# Patient Record
Sex: MALE | Race: WHITE | Employment: PART TIME | URBAN - METROPOLITAN AREA
[De-identification: names, ages, dates, MRNs, and addresses within clinical notes are randomized per-mention and may not be internally consistent; named-entity substitution may affect disease eponyms.]

---

## 2020-01-30 ENCOUNTER — HOSPITAL ENCOUNTER (INPATIENT)
Age: 55
LOS: 1 days | Discharge: HOME | DRG: 247 | End: 2020-02-01
Attending: EMERGENCY MEDICINE | Admitting: HOSPITALIST
Payer: COMMERCIAL

## 2020-01-30 ENCOUNTER — APPOINTMENT (OUTPATIENT)
Dept: GENERAL RADIOLOGY | Age: 55
DRG: 247 | End: 2020-01-30
Attending: EMERGENCY MEDICINE
Payer: COMMERCIAL

## 2020-01-30 DIAGNOSIS — R94.31 ABNORMAL EKG: ICD-10-CM

## 2020-01-30 DIAGNOSIS — I24.9 ACS (ACUTE CORONARY SYNDROME) (HCC): ICD-10-CM

## 2020-01-30 DIAGNOSIS — R07.89 ATYPICAL CHEST PAIN: Primary | ICD-10-CM

## 2020-01-30 PROBLEM — R07.9 CHEST PAIN: Status: ACTIVE | Noted: 2020-01-30

## 2020-01-30 LAB
ALBUMIN SERPL-MCNC: 3.3 G/DL (ref 3.5–5)
ALBUMIN/GLOB SERPL: 1.1 {RATIO} (ref 1.1–2.2)
ALP SERPL-CCNC: 62 U/L (ref 45–117)
ALT SERPL-CCNC: 35 U/L (ref 12–78)
ANION GAP SERPL CALC-SCNC: 10 MMOL/L (ref 5–15)
APPEARANCE UR: CLEAR
AST SERPL-CCNC: 26 U/L (ref 15–37)
BACTERIA URNS QL MICRO: NEGATIVE /HPF
BASOPHILS # BLD: 0.1 K/UL (ref 0–0.1)
BASOPHILS NFR BLD: 1 % (ref 0–1)
BILIRUB SERPL-MCNC: 0.2 MG/DL (ref 0.2–1)
BILIRUB UR QL: NEGATIVE
BNP SERPL-MCNC: 62 PG/ML
BUN SERPL-MCNC: 8 MG/DL (ref 6–20)
BUN/CREAT SERPL: 13 (ref 12–20)
CALCIUM SERPL-MCNC: 8.5 MG/DL (ref 8.5–10.1)
CHLORIDE SERPL-SCNC: 112 MMOL/L (ref 97–108)
CK SERPL-CCNC: 196 U/L (ref 39–308)
CO2 SERPL-SCNC: 22 MMOL/L (ref 21–32)
COLOR UR: NORMAL
CREAT SERPL-MCNC: 0.64 MG/DL (ref 0.7–1.3)
D DIMER PPP FEU-MCNC: 0.26 MG/L FEU (ref 0–0.65)
DIFFERENTIAL METHOD BLD: NORMAL
EOSINOPHIL # BLD: 0.2 K/UL (ref 0–0.4)
EOSINOPHIL NFR BLD: 3 % (ref 0–7)
EPITH CASTS URNS QL MICRO: NORMAL /LPF
ERYTHROCYTE [DISTWIDTH] IN BLOOD BY AUTOMATED COUNT: 12.6 % (ref 11.5–14.5)
GLOBULIN SER CALC-MCNC: 3 G/DL (ref 2–4)
GLUCOSE SERPL-MCNC: 158 MG/DL (ref 65–100)
GLUCOSE UR STRIP.AUTO-MCNC: NEGATIVE MG/DL
HCT VFR BLD AUTO: 43 % (ref 36.6–50.3)
HGB BLD-MCNC: 14.9 G/DL (ref 12.1–17)
HGB UR QL STRIP: NEGATIVE
HYALINE CASTS URNS QL MICRO: NORMAL /LPF (ref 0–5)
IMM GRANULOCYTES # BLD AUTO: 0 K/UL (ref 0–0.04)
IMM GRANULOCYTES NFR BLD AUTO: 0 % (ref 0–0.5)
KETONES UR QL STRIP.AUTO: NEGATIVE MG/DL
LEUKOCYTE ESTERASE UR QL STRIP.AUTO: NEGATIVE
LYMPHOCYTES # BLD: 2.1 K/UL (ref 0.8–3.5)
LYMPHOCYTES NFR BLD: 29 % (ref 12–49)
MCH RBC QN AUTO: 32.5 PG (ref 26–34)
MCHC RBC AUTO-ENTMCNC: 34.7 G/DL (ref 30–36.5)
MCV RBC AUTO: 93.7 FL (ref 80–99)
MONOCYTES # BLD: 0.4 K/UL (ref 0–1)
MONOCYTES NFR BLD: 6 % (ref 5–13)
NEUTS SEG # BLD: 4.5 K/UL (ref 1.8–8)
NEUTS SEG NFR BLD: 61 % (ref 32–75)
NITRITE UR QL STRIP.AUTO: NEGATIVE
NRBC # BLD: 0 K/UL (ref 0–0.01)
NRBC BLD-RTO: 0 PER 100 WBC
PH UR STRIP: 6 [PH] (ref 5–8)
PLATELET # BLD AUTO: 278 K/UL (ref 150–400)
PMV BLD AUTO: 9.6 FL (ref 8.9–12.9)
POTASSIUM SERPL-SCNC: 3.9 MMOL/L (ref 3.5–5.1)
PROT SERPL-MCNC: 6.3 G/DL (ref 6.4–8.2)
PROT UR STRIP-MCNC: NEGATIVE MG/DL
RBC # BLD AUTO: 4.59 M/UL (ref 4.1–5.7)
RBC #/AREA URNS HPF: NORMAL /HPF (ref 0–5)
SODIUM SERPL-SCNC: 144 MMOL/L (ref 136–145)
SP GR UR REFRACTOMETRY: 1.02 (ref 1–1.03)
TROPONIN I BLD-MCNC: <0.04 NG/ML (ref 0–0.08)
TROPONIN I SERPL-MCNC: <0.05 NG/ML
UA: UC IF INDICATED,UAUC: NORMAL
UROBILINOGEN UR QL STRIP.AUTO: 0.2 EU/DL (ref 0.2–1)
WBC # BLD AUTO: 7.2 K/UL (ref 4.1–11.1)
WBC URNS QL MICRO: NORMAL /HPF (ref 0–4)

## 2020-01-30 PROCEDURE — 83880 ASSAY OF NATRIURETIC PEPTIDE: CPT

## 2020-01-30 PROCEDURE — 85379 FIBRIN DEGRADATION QUANT: CPT

## 2020-01-30 PROCEDURE — 74011000250 HC RX REV CODE- 250: Performed by: EMERGENCY MEDICINE

## 2020-01-30 PROCEDURE — 84484 ASSAY OF TROPONIN QUANT: CPT

## 2020-01-30 PROCEDURE — 99285 EMERGENCY DEPT VISIT HI MDM: CPT

## 2020-01-30 PROCEDURE — 96374 THER/PROPH/DIAG INJ IV PUSH: CPT

## 2020-01-30 PROCEDURE — 83036 HEMOGLOBIN GLYCOSYLATED A1C: CPT

## 2020-01-30 PROCEDURE — 85025 COMPLETE CBC W/AUTO DIFF WBC: CPT

## 2020-01-30 PROCEDURE — 82550 ASSAY OF CK (CPK): CPT

## 2020-01-30 PROCEDURE — 74011250636 HC RX REV CODE- 250/636: Performed by: EMERGENCY MEDICINE

## 2020-01-30 PROCEDURE — 81001 URINALYSIS AUTO W/SCOPE: CPT

## 2020-01-30 PROCEDURE — 96375 TX/PRO/DX INJ NEW DRUG ADDON: CPT

## 2020-01-30 PROCEDURE — 71046 X-RAY EXAM CHEST 2 VIEWS: CPT

## 2020-01-30 PROCEDURE — 93005 ELECTROCARDIOGRAM TRACING: CPT

## 2020-01-30 PROCEDURE — 80061 LIPID PANEL: CPT

## 2020-01-30 PROCEDURE — 36415 COLL VENOUS BLD VENIPUNCTURE: CPT

## 2020-01-30 PROCEDURE — 77030029684 HC NEB SM VOL KT MONA -A

## 2020-01-30 PROCEDURE — 80053 COMPREHEN METABOLIC PANEL: CPT

## 2020-01-30 RX ORDER — ONDANSETRON 2 MG/ML
4 INJECTION INTRAMUSCULAR; INTRAVENOUS
Status: DISCONTINUED | OUTPATIENT
Start: 2020-01-30 | End: 2020-02-01 | Stop reason: HOSPADM

## 2020-01-30 RX ORDER — HYDROCHLOROTHIAZIDE 12.5 MG/1
25 CAPSULE ORAL DAILY
COMMUNITY

## 2020-01-30 RX ORDER — SODIUM CHLORIDE 0.9 % (FLUSH) 0.9 %
5-40 SYRINGE (ML) INJECTION EVERY 8 HOURS
Status: DISCONTINUED | OUTPATIENT
Start: 2020-01-30 | End: 2020-02-01 | Stop reason: HOSPADM

## 2020-01-30 RX ORDER — HYDROCHLOROTHIAZIDE 12.5 MG/1
25 CAPSULE ORAL DAILY
Status: DISCONTINUED | OUTPATIENT
Start: 2020-01-31 | End: 2020-01-31

## 2020-01-30 RX ORDER — AMLODIPINE BESYLATE 10 MG/1
10 TABLET ORAL DAILY
COMMUNITY

## 2020-01-30 RX ORDER — SODIUM CHLORIDE 0.9 % (FLUSH) 0.9 %
5-40 SYRINGE (ML) INJECTION AS NEEDED
Status: DISCONTINUED | OUTPATIENT
Start: 2020-01-30 | End: 2020-02-01 | Stop reason: HOSPADM

## 2020-01-30 RX ORDER — MORPHINE SULFATE 2 MG/ML
4 INJECTION, SOLUTION INTRAMUSCULAR; INTRAVENOUS ONCE
Status: COMPLETED | OUTPATIENT
Start: 2020-01-30 | End: 2020-01-30

## 2020-01-30 RX ORDER — AMLODIPINE BESYLATE 5 MG/1
10 TABLET ORAL DAILY
Status: DISCONTINUED | OUTPATIENT
Start: 2020-01-31 | End: 2020-02-01 | Stop reason: HOSPADM

## 2020-01-30 RX ORDER — ACETAMINOPHEN 325 MG/1
500 TABLET ORAL
COMMUNITY

## 2020-01-30 RX ORDER — METHOCARBAMOL 750 MG/1
750 TABLET, FILM COATED ORAL 4 TIMES DAILY
COMMUNITY

## 2020-01-30 RX ORDER — IPRATROPIUM BROMIDE AND ALBUTEROL SULFATE 2.5; .5 MG/3ML; MG/3ML
3 SOLUTION RESPIRATORY (INHALATION)
Status: COMPLETED | OUTPATIENT
Start: 2020-01-30 | End: 2020-01-30

## 2020-01-30 RX ORDER — ACETAMINOPHEN 325 MG/1
650 TABLET ORAL
Status: DISCONTINUED | OUTPATIENT
Start: 2020-01-30 | End: 2020-02-01 | Stop reason: HOSPADM

## 2020-01-30 RX ORDER — BACLOFEN 10 MG/1
10 TABLET ORAL 3 TIMES DAILY
COMMUNITY

## 2020-01-30 RX ORDER — ONDANSETRON 2 MG/ML
8 INJECTION INTRAMUSCULAR; INTRAVENOUS
Status: COMPLETED | OUTPATIENT
Start: 2020-01-30 | End: 2020-01-30

## 2020-01-30 RX ADMIN — MORPHINE SULFATE 4 MG: 2 INJECTION, SOLUTION INTRAMUSCULAR; INTRAVENOUS at 22:28

## 2020-01-30 RX ADMIN — IPRATROPIUM BROMIDE AND ALBUTEROL SULFATE 3 ML: .5; 3 SOLUTION RESPIRATORY (INHALATION) at 22:28

## 2020-01-30 RX ADMIN — SODIUM CHLORIDE 1000 ML: 900 INJECTION, SOLUTION INTRAVENOUS at 22:36

## 2020-01-30 RX ADMIN — ONDANSETRON 8 MG: 2 INJECTION INTRAMUSCULAR; INTRAVENOUS at 22:28

## 2020-01-30 NOTE — Clinical Note
Lesion located in the Proximal Diag 1. Balloon inserted. Balloon inflated using multiple inflations inflation technique. Pressure = 8 leola; Duration = 16 sec. Inflation 2: Pressure: 8 leola; Duration: 15 sec.

## 2020-01-30 NOTE — Clinical Note
TRANSFER - OUT REPORT:     Verbal report given to: Julissa. Report consisted of patient's Situation, Background, Assessment and   Recommendations(SBAR). Opportunity for questions and clarification was provided. Patient transported with a Registered Nurse. Patient transported to: [facility].

## 2020-01-30 NOTE — Clinical Note
Lesion: Located in the Proximal Diag 1. Stent inserted. Stent deployed. First inflation pressure = 12 leola; inflation time: 15 sec.

## 2020-01-31 ENCOUNTER — APPOINTMENT (OUTPATIENT)
Dept: NON INVASIVE DIAGNOSTICS | Age: 55
DRG: 247 | End: 2020-01-31
Attending: NURSE PRACTITIONER
Payer: COMMERCIAL

## 2020-01-31 PROBLEM — Z98.890 S/P CARDIAC CATH: Status: ACTIVE | Noted: 2020-01-31

## 2020-01-31 PROBLEM — I25.10 CAD (CORONARY ARTERY DISEASE): Status: ACTIVE | Noted: 2020-01-31

## 2020-01-31 LAB
ANION GAP SERPL CALC-SCNC: 5 MMOL/L (ref 5–15)
ATRIAL RATE: 107 BPM
ATRIAL RATE: 85 BPM
ATRIAL RATE: 87 BPM
AV VELOCITY RATIO: 0.94
BUN SERPL-MCNC: 12 MG/DL (ref 6–20)
BUN/CREAT SERPL: 17 (ref 12–20)
CALCIUM SERPL-MCNC: 8.5 MG/DL (ref 8.5–10.1)
CALCULATED P AXIS, ECG09: 53 DEGREES
CALCULATED P AXIS, ECG09: 64 DEGREES
CALCULATED P AXIS, ECG09: 67 DEGREES
CALCULATED R AXIS, ECG10: 0 DEGREES
CALCULATED R AXIS, ECG10: 17 DEGREES
CALCULATED R AXIS, ECG10: 20 DEGREES
CALCULATED T AXIS, ECG11: 17 DEGREES
CALCULATED T AXIS, ECG11: 35 DEGREES
CALCULATED T AXIS, ECG11: 38 DEGREES
CHLORIDE SERPL-SCNC: 108 MMOL/L (ref 97–108)
CHOLEST SERPL-MCNC: 252 MG/DL
CO2 SERPL-SCNC: 29 MMOL/L (ref 21–32)
CREAT SERPL-MCNC: 0.71 MG/DL (ref 0.7–1.3)
DIAGNOSIS, 93000: NORMAL
ECHO AV AREA PEAK VELOCITY: 3.2 CM2
ECHO AV AREA/BSA PEAK VELOCITY: 1.7 CM2/M2
ECHO AV CUSP MM: 2.14 CM
ECHO AV PEAK GRADIENT: 6.2 MMHG
ECHO AV PEAK VELOCITY: 124.78 CM/S
ECHO LA MAJOR AXIS: 3.64 CM
ECHO LV EDV TEICHHOLZ: 0.38 ML
ECHO LV ESV TEICHHOLZ: 0.14 ML
ECHO LV INTERNAL DIMENSION DIASTOLIC: 3.76 CM (ref 4.2–5.9)
ECHO LV INTERNAL DIMENSION SYSTOLIC: 2.5 CM
ECHO LV IVSD: 2.11 CM (ref 0.6–1)
ECHO LV IVSS: 2.65 CM
ECHO LV MASS 2D: 451.2 G (ref 88–224)
ECHO LV MASS INDEX 2D: 246.9 G/M2 (ref 49–115)
ECHO LV POSTERIOR WALL DIASTOLIC: 2.06 CM (ref 0.6–1)
ECHO LV POSTERIOR WALL SYSTOLIC: 2.56 CM
ECHO LVOT DIAM: 2.07 CM
ECHO LVOT PEAK GRADIENT: 5.5 MMHG
ECHO LVOT PEAK VELOCITY: 117.07 CM/S
ECHO MV A VELOCITY: 67.39 CM/S
ECHO MV E DECELERATION TIME (DT): 230.8 MS
ECHO MV E VELOCITY: 54.94 CM/S
ECHO MV E/A RATIO: 0.82
ECHO PV MAX VELOCITY: 93.51 CM/S
ECHO PV PEAK GRADIENT: 3.5 MMHG
ECHO RV INTERNAL DIMENSION: 2.99 CM
EST. AVERAGE GLUCOSE BLD GHB EST-MCNC: 114 MG/DL
GLUCOSE SERPL-MCNC: 92 MG/DL (ref 65–100)
HBA1C MFR BLD: 5.6 % (ref 4–5.6)
HDLC SERPL-MCNC: 61 MG/DL
HDLC SERPL: 4.1 {RATIO} (ref 0–5)
LDLC SERPL CALC-MCNC: 158.8 MG/DL (ref 0–100)
LIPID PROFILE,FLP: ABNORMAL
LVFS 2D: 33.63 %
LVSV (TEICH): 20.36 ML
MV DEC SLOPE: 2.39
P-R INTERVAL, ECG05: 162 MS
P-R INTERVAL, ECG05: 166 MS
P-R INTERVAL, ECG05: 166 MS
POTASSIUM SERPL-SCNC: 4.3 MMOL/L (ref 3.5–5.1)
Q-T INTERVAL, ECG07: 350 MS
Q-T INTERVAL, ECG07: 368 MS
Q-T INTERVAL, ECG07: 386 MS
QRS DURATION, ECG06: 86 MS
QRS DURATION, ECG06: 92 MS
QRS DURATION, ECG06: 94 MS
QTC CALCULATION (BEZET), ECG08: 442 MS
QTC CALCULATION (BEZET), ECG08: 459 MS
QTC CALCULATION (BEZET), ECG08: 467 MS
SODIUM SERPL-SCNC: 142 MMOL/L (ref 136–145)
TRIGL SERPL-MCNC: 161 MG/DL (ref ?–150)
TROPONIN I BLD-MCNC: <0.04 NG/ML (ref 0–0.08)
TROPONIN I SERPL-MCNC: <0.05 NG/ML
VENTRICULAR RATE, ECG03: 107 BPM
VENTRICULAR RATE, ECG03: 85 BPM
VENTRICULAR RATE, ECG03: 87 BPM
VLDLC SERPL CALC-MCNC: 32.2 MG/DL

## 2020-01-31 PROCEDURE — 77030028837 HC SYR ANGI PWR INJ COEU -A: Performed by: INTERNAL MEDICINE

## 2020-01-31 PROCEDURE — 84484 ASSAY OF TROPONIN QUANT: CPT

## 2020-01-31 PROCEDURE — 76937 US GUIDE VASCULAR ACCESS: CPT | Performed by: INTERNAL MEDICINE

## 2020-01-31 PROCEDURE — C1887 CATHETER, GUIDING: HCPCS | Performed by: INTERNAL MEDICINE

## 2020-01-31 PROCEDURE — 77030010221 HC SPLNT WR POS TELE -B: Performed by: INTERNAL MEDICINE

## 2020-01-31 PROCEDURE — 85347 COAGULATION TIME ACTIVATED: CPT

## 2020-01-31 PROCEDURE — 77030004549 HC CATH ANGI DX PRF MRTM -A: Performed by: INTERNAL MEDICINE

## 2020-01-31 PROCEDURE — 77030015766: Performed by: INTERNAL MEDICINE

## 2020-01-31 PROCEDURE — 74011250637 HC RX REV CODE- 250/637: Performed by: NURSE PRACTITIONER

## 2020-01-31 PROCEDURE — C1769 GUIDE WIRE: HCPCS | Performed by: INTERNAL MEDICINE

## 2020-01-31 PROCEDURE — C1725 CATH, TRANSLUMIN NON-LASER: HCPCS | Performed by: INTERNAL MEDICINE

## 2020-01-31 PROCEDURE — 99153 MOD SED SAME PHYS/QHP EA: CPT | Performed by: INTERNAL MEDICINE

## 2020-01-31 PROCEDURE — C1874 STENT, COATED/COV W/DEL SYS: HCPCS | Performed by: INTERNAL MEDICINE

## 2020-01-31 PROCEDURE — 74011250636 HC RX REV CODE- 250/636: Performed by: NURSE PRACTITIONER

## 2020-01-31 PROCEDURE — 99218 HC RM OBSERVATION: CPT

## 2020-01-31 PROCEDURE — 93306 TTE W/DOPPLER COMPLETE: CPT

## 2020-01-31 PROCEDURE — C1894 INTRO/SHEATH, NON-LASER: HCPCS | Performed by: INTERNAL MEDICINE

## 2020-01-31 PROCEDURE — 65660000000 HC RM CCU STEPDOWN

## 2020-01-31 PROCEDURE — 027034Z DILATION OF CORONARY ARTERY, ONE ARTERY WITH DRUG-ELUTING INTRALUMINAL DEVICE, PERCUTANEOUS APPROACH: ICD-10-PCS | Performed by: INTERNAL MEDICINE

## 2020-01-31 PROCEDURE — 74011250636 HC RX REV CODE- 250/636: Performed by: INTERNAL MEDICINE

## 2020-01-31 PROCEDURE — B2111ZZ FLUOROSCOPY OF MULTIPLE CORONARY ARTERIES USING LOW OSMOLAR CONTRAST: ICD-10-PCS | Performed by: INTERNAL MEDICINE

## 2020-01-31 PROCEDURE — 96372 THER/PROPH/DIAG INJ SC/IM: CPT

## 2020-01-31 PROCEDURE — 4A023N7 MEASUREMENT OF CARDIAC SAMPLING AND PRESSURE, LEFT HEART, PERCUTANEOUS APPROACH: ICD-10-PCS | Performed by: INTERNAL MEDICINE

## 2020-01-31 PROCEDURE — B2151ZZ FLUOROSCOPY OF LEFT HEART USING LOW OSMOLAR CONTRAST: ICD-10-PCS | Performed by: INTERNAL MEDICINE

## 2020-01-31 PROCEDURE — 74011250637 HC RX REV CODE- 250/637: Performed by: HOSPITALIST

## 2020-01-31 PROCEDURE — 74011636320 HC RX REV CODE- 636/320: Performed by: INTERNAL MEDICINE

## 2020-01-31 PROCEDURE — 80048 BASIC METABOLIC PNL TOTAL CA: CPT

## 2020-01-31 PROCEDURE — 74011000250 HC RX REV CODE- 250: Performed by: INTERNAL MEDICINE

## 2020-01-31 PROCEDURE — 74011250637 HC RX REV CODE- 250/637: Performed by: INTERNAL MEDICINE

## 2020-01-31 PROCEDURE — 36415 COLL VENOUS BLD VENIPUNCTURE: CPT

## 2020-01-31 PROCEDURE — 77030019569 HC BND COMPR RAD TERU -B: Performed by: INTERNAL MEDICINE

## 2020-01-31 PROCEDURE — 77030008543 HC TBNG MON PRSS MRTM -A: Performed by: INTERNAL MEDICINE

## 2020-01-31 PROCEDURE — 99152 MOD SED SAME PHYS/QHP 5/>YRS: CPT | Performed by: INTERNAL MEDICINE

## 2020-01-31 PROCEDURE — 93458 L HRT ARTERY/VENTRICLE ANGIO: CPT | Performed by: INTERNAL MEDICINE

## 2020-01-31 PROCEDURE — 92928 PRQ TCAT PLMT NTRAC ST 1 LES: CPT | Performed by: INTERNAL MEDICINE

## 2020-01-31 PROCEDURE — 93005 ELECTROCARDIOGRAM TRACING: CPT

## 2020-01-31 PROCEDURE — 77030019697 HC SYR ANGI INFL MRTM -B: Performed by: INTERNAL MEDICINE

## 2020-01-31 PROCEDURE — 74011250636 HC RX REV CODE- 250/636: Performed by: HOSPITALIST

## 2020-01-31 PROCEDURE — 77030019698 HC SYR ANGI MDLON MRTM -A: Performed by: INTERNAL MEDICINE

## 2020-01-31 DEVICE — STENT RONYX30022UX RESOLUTE ONYX 3.00X22
Type: IMPLANTABLE DEVICE | Status: FUNCTIONAL
Brand: RESOLUTE ONYX™

## 2020-01-31 RX ORDER — IPRATROPIUM BROMIDE AND ALBUTEROL SULFATE 2.5; .5 MG/3ML; MG/3ML
3 SOLUTION RESPIRATORY (INHALATION)
Status: DISCONTINUED | OUTPATIENT
Start: 2020-01-31 | End: 2020-02-01 | Stop reason: HOSPADM

## 2020-01-31 RX ORDER — LANOLIN ALCOHOL/MO/W.PET/CERES
3 CREAM (GRAM) TOPICAL
Status: DISCONTINUED | OUTPATIENT
Start: 2020-01-31 | End: 2020-02-01 | Stop reason: HOSPADM

## 2020-01-31 RX ORDER — HEPARIN SODIUM 1000 [USP'U]/ML
INJECTION, SOLUTION INTRAVENOUS; SUBCUTANEOUS AS NEEDED
Status: DISCONTINUED | OUTPATIENT
Start: 2020-01-31 | End: 2020-01-31

## 2020-01-31 RX ORDER — IBUPROFEN 200 MG
1 TABLET ORAL DAILY
Status: DISCONTINUED | OUTPATIENT
Start: 2020-01-31 | End: 2020-02-01 | Stop reason: HOSPADM

## 2020-01-31 RX ORDER — HEPARIN SODIUM 200 [USP'U]/100ML
INJECTION, SOLUTION INTRAVENOUS
Status: DISCONTINUED | OUTPATIENT
Start: 2020-01-31 | End: 2020-01-31

## 2020-01-31 RX ORDER — FOLIC ACID 1 MG/1
1 TABLET ORAL DAILY
Status: DISCONTINUED | OUTPATIENT
Start: 2020-01-31 | End: 2020-02-01 | Stop reason: HOSPADM

## 2020-01-31 RX ORDER — ATORVASTATIN CALCIUM 20 MG/1
20 TABLET, FILM COATED ORAL
Status: DISCONTINUED | OUTPATIENT
Start: 2020-01-31 | End: 2020-02-01 | Stop reason: HOSPADM

## 2020-01-31 RX ORDER — ASPIRIN 325 MG/1
100 TABLET, FILM COATED ORAL DAILY
Status: DISCONTINUED | OUTPATIENT
Start: 2020-01-31 | End: 2020-02-01 | Stop reason: HOSPADM

## 2020-01-31 RX ORDER — SODIUM CHLORIDE 9 MG/ML
100 INJECTION, SOLUTION INTRAVENOUS CONTINUOUS
Status: DISCONTINUED | OUTPATIENT
Start: 2020-01-31 | End: 2020-02-01 | Stop reason: HOSPADM

## 2020-01-31 RX ORDER — ENOXAPARIN SODIUM 100 MG/ML
40 INJECTION SUBCUTANEOUS EVERY 24 HOURS
Status: DISCONTINUED | OUTPATIENT
Start: 2020-01-31 | End: 2020-01-31

## 2020-01-31 RX ORDER — FENTANYL CITRATE 50 UG/ML
25 INJECTION, SOLUTION INTRAMUSCULAR; INTRAVENOUS
Status: DISCONTINUED | OUTPATIENT
Start: 2020-01-31 | End: 2020-02-01 | Stop reason: HOSPADM

## 2020-01-31 RX ORDER — MIDAZOLAM HYDROCHLORIDE 1 MG/ML
INJECTION, SOLUTION INTRAMUSCULAR; INTRAVENOUS AS NEEDED
Status: DISCONTINUED | OUTPATIENT
Start: 2020-01-31 | End: 2020-01-31

## 2020-01-31 RX ORDER — FENTANYL CITRATE 50 UG/ML
INJECTION, SOLUTION INTRAMUSCULAR; INTRAVENOUS AS NEEDED
Status: DISCONTINUED | OUTPATIENT
Start: 2020-01-31 | End: 2020-01-31

## 2020-01-31 RX ORDER — VERAPAMIL HYDROCHLORIDE 2.5 MG/ML
INJECTION, SOLUTION INTRAVENOUS AS NEEDED
Status: DISCONTINUED | OUTPATIENT
Start: 2020-01-31 | End: 2020-01-31

## 2020-01-31 RX ORDER — OXYCODONE HYDROCHLORIDE 5 MG/1
5 TABLET ORAL
Status: DISCONTINUED | OUTPATIENT
Start: 2020-01-31 | End: 2020-02-01 | Stop reason: HOSPADM

## 2020-01-31 RX ORDER — HYDROCHLOROTHIAZIDE 25 MG/1
25 TABLET ORAL DAILY
Status: DISCONTINUED | OUTPATIENT
Start: 2020-01-31 | End: 2020-02-01 | Stop reason: HOSPADM

## 2020-01-31 RX ORDER — SODIUM CHLORIDE 0.9 % (FLUSH) 0.9 %
5-40 SYRINGE (ML) INJECTION AS NEEDED
Status: DISCONTINUED | OUTPATIENT
Start: 2020-01-31 | End: 2020-02-01 | Stop reason: HOSPADM

## 2020-01-31 RX ORDER — GUAIFENESIN 100 MG/5ML
81 LIQUID (ML) ORAL DAILY
Status: DISCONTINUED | OUTPATIENT
Start: 2020-01-31 | End: 2020-02-01 | Stop reason: HOSPADM

## 2020-01-31 RX ORDER — NITROGLYCERIN 0.4 MG/1
0.4 TABLET SUBLINGUAL AS NEEDED
Status: COMPLETED | OUTPATIENT
Start: 2020-01-31 | End: 2020-01-31

## 2020-01-31 RX ORDER — NALOXONE HYDROCHLORIDE 0.4 MG/ML
0.4 INJECTION, SOLUTION INTRAMUSCULAR; INTRAVENOUS; SUBCUTANEOUS AS NEEDED
Status: DISCONTINUED | OUTPATIENT
Start: 2020-01-31 | End: 2020-02-01 | Stop reason: HOSPADM

## 2020-01-31 RX ORDER — LIDOCAINE HYDROCHLORIDE 10 MG/ML
INJECTION, SOLUTION EPIDURAL; INFILTRATION; INTRACAUDAL; PERINEURAL AS NEEDED
Status: DISCONTINUED | OUTPATIENT
Start: 2020-01-31 | End: 2020-01-31

## 2020-01-31 RX ORDER — METOPROLOL TARTRATE 25 MG/1
12.5 TABLET, FILM COATED ORAL EVERY 12 HOURS
Status: DISCONTINUED | OUTPATIENT
Start: 2020-01-31 | End: 2020-02-01

## 2020-01-31 RX ORDER — ATORVASTATIN CALCIUM 10 MG/1
10 TABLET, FILM COATED ORAL
Status: DISCONTINUED | OUTPATIENT
Start: 2020-01-31 | End: 2020-01-31

## 2020-01-31 RX ORDER — SODIUM CHLORIDE 0.9 % (FLUSH) 0.9 %
5-40 SYRINGE (ML) INJECTION EVERY 8 HOURS
Status: DISCONTINUED | OUTPATIENT
Start: 2020-01-31 | End: 2020-02-01 | Stop reason: HOSPADM

## 2020-01-31 RX ADMIN — ATORVASTATIN CALCIUM 20 MG: 20 TABLET, FILM COATED ORAL at 23:51

## 2020-01-31 RX ADMIN — Medication 10 ML: at 15:07

## 2020-01-31 RX ADMIN — MELATONIN 3 MG: at 23:59

## 2020-01-31 RX ADMIN — ACETAMINOPHEN 650 MG: 325 TABLET ORAL at 02:22

## 2020-01-31 RX ADMIN — SODIUM CHLORIDE 100 ML/HR: 900 INJECTION, SOLUTION INTRAVENOUS at 12:10

## 2020-01-31 RX ADMIN — NITROGLYCERIN 1 INCH: 20 OINTMENT TOPICAL at 09:20

## 2020-01-31 RX ADMIN — FENTANYL CITRATE 25 MCG: 50 INJECTION INTRAMUSCULAR; INTRAVENOUS at 17:51

## 2020-01-31 RX ADMIN — Medication 10 ML: at 06:07

## 2020-01-31 RX ADMIN — NITROGLYCERIN 0.4 MG: 0.4 TABLET SUBLINGUAL at 09:54

## 2020-01-31 RX ADMIN — Medication 10 ML: at 23:51

## 2020-01-31 RX ADMIN — Medication 100 MG: at 07:56

## 2020-01-31 RX ADMIN — METOPROLOL TARTRATE 12.5 MG: 25 TABLET ORAL at 10:55

## 2020-01-31 RX ADMIN — NITROGLYCERIN 0.4 MG: 0.4 TABLET SUBLINGUAL at 09:49

## 2020-01-31 RX ADMIN — ASPIRIN 81 MG 81 MG: 81 TABLET ORAL at 10:55

## 2020-01-31 RX ADMIN — FOLIC ACID 1 MG: 1 TABLET ORAL at 10:55

## 2020-01-31 RX ADMIN — FENTANYL CITRATE 25 MCG: 50 INJECTION INTRAMUSCULAR; INTRAVENOUS at 23:59

## 2020-01-31 RX ADMIN — AMLODIPINE BESYLATE 10 MG: 5 TABLET ORAL at 07:56

## 2020-01-31 RX ADMIN — ENOXAPARIN SODIUM 40 MG: 40 INJECTION SUBCUTANEOUS at 10:56

## 2020-01-31 RX ADMIN — HYDROCHLOROTHIAZIDE 25 MG: 25 TABLET ORAL at 07:56

## 2020-01-31 RX ADMIN — FENTANYL CITRATE 25 MCG: 50 INJECTION INTRAMUSCULAR; INTRAVENOUS at 03:23

## 2020-01-31 RX ADMIN — OXYCODONE 5 MG: 5 TABLET ORAL at 08:16

## 2020-01-31 RX ADMIN — Medication 10 ML: at 02:23

## 2020-01-31 RX ADMIN — TICAGRELOR 90 MG: 90 TABLET ORAL at 23:51

## 2020-01-31 RX ADMIN — NITROGLYCERIN 0.4 MG: 0.4 TABLET SUBLINGUAL at 10:02

## 2020-01-31 RX ADMIN — FENTANYL CITRATE 25 MCG: 50 INJECTION INTRAMUSCULAR; INTRAVENOUS at 15:07

## 2020-01-31 RX ADMIN — METOPROLOL TARTRATE 12.5 MG: 25 TABLET ORAL at 23:49

## 2020-01-31 RX ADMIN — OXYCODONE 5 MG: 5 TABLET ORAL at 02:22

## 2020-01-31 NOTE — PROGRESS NOTES
Patient arrived to the unit and the writer called Radha Cartrwight RN for report. According to her report, MD Nolasco requested to check Troponin and EKG for this patient every 3 hours.  MD Terry Arndt was notified by page as this patient may need orders to collect blood and perform an EKG      Success  A new connect request was successfully created for: Parisa Peer : 1965 MRN: [id number] ConnectID: [id number] REASON: Other non-Emergent ACUITY: 30 Minutes SUBMITTED: 2020 02:12 EST

## 2020-01-31 NOTE — H&P
HISTORY AND PHYSICAL      PCP: Colt Mckee MD  History source: the patient      CC: chest pain      HPI: 47 y.o man with HTN, COPD, tobacco use, who presents with chest pain. Symptoms began around 5:30 PM. He describes pain at the bottom of his chest extending to both sides. It was severe. He was feeding his dogs when it happened. He had similar episodes in the past but not as severe. The episode lasted about 45 minutes, but then recurred a few hours later. He denies dyspnea or fever. He has a dry cough that has been present for months. PMH/PSH:  Past Medical History:   Diagnosis Date    Chronic obstructive pulmonary disease ([facility] [address]     Hypertension      Past Surgical History:   Procedure Laterality Date    HX HERNIA REPAIR  2004    HX ORTHOPAEDIC      carpal tunnel bilateral, lumbar unknown to pt which ones       Home meds:   Prior to Admission medications    Medication Sig Start Date End Date Taking? Authorizing Provider   methocarbamol (ROBAXIN) 750 mg tablet Take 750 mg by mouth four (4) times daily. Yes Colt Mckee MD   amLODIPine (NORVASC) 10 mg tablet Take 10 mg by mouth daily. Yes Colt Mckee MD   hydroCHLOROthiazide (MICROZIDE) 12.5 mg capsule Take 25 mg by mouth daily. Yes Colt Mckee MD   acetaminophen (TYLENOL) 325 mg tablet Take 500 mg by mouth every four (4) hours as needed for Pain. Yes Colt Mckee MD   baclofen (LIORESAL) 10 mg tablet Take 10 mg by mouth three (3) times daily. Yes Colt Mckee MD       Allergies:  No Known Allergies    FH:  History reviewed. No pertinent family history. SH:  Social History     Tobacco Use    Smoking status: Current Every Day Smoker     Packs/day: 1.50    Smokeless tobacco: Never Used   Substance Use Topics    Alcohol use:  Yes     Alcohol/week: 6.0 - 8.0 standard drinks     Types: 6 - 8 Cans of beer per week     Comment: daily drinker       ROS: A comprehensive review of systems was negative except for that written in the HPI.      PHYSICAL EXAM:  Visit Vitals  /87   Pulse 90   Temp 97.8 °F (36.6 °C)   Resp 17   Ht 5' 3\" (1.6 m)   Wt 79.4 kg (175 lb)   SpO2 92%   BMI 31.00 kg/m²       Gen: NAD, non-toxic  HEENT: anicteric sclerae, normal conjunctiva, oropharynx clear, MM moist  Neck: supple, trachea midline, no adenopathy  Heart: RRR, no MRG, no JVD, no peripheral edema  Lungs: diminished breath sounds globally, non-labored respirations  Abd: soft, NT, ND, BS+  Extr: warm  Skin: dry, no rash  Neuro: CN II-XII grossly intact, normal speech, moves all extremities  Psych: normal mood, appropriate affect      Labs/Imaging:  Recent Results (from the past 24 hour(s))   EKG, 12 LEAD, INITIAL    Collection Time: 01/30/20  8:10 PM   Result Value Ref Range    Ventricular Rate 107 BPM    Atrial Rate 107 BPM    P-R Interval 162 ms    QRS Duration 86 ms    Q-T Interval 350 ms    QTC Calculation (Bezet) 467 ms    Calculated P Axis 53 degrees    Calculated R Axis 0 degrees    Calculated T Axis 35 degrees    Diagnosis       Sinus tachycardia  Septal infarct , age undetermined  No previous ECGs available     CBC WITH AUTOMATED DIFF    Collection Time: 01/30/20  8:35 PM   Result Value Ref Range    WBC 7.2 4.1 - 11.1 K/uL    RBC 4.59 4. 10 - 5.70 M/uL    HGB 14.9 12.1 - 17.0 g/dL    HCT 43.0 36.6 - 50.3 %    MCV 93.7 80.0 - 99.0 FL    MCH 32.5 26.0 - 34.0 PG    MCHC 34.7 30.0 - 36.5 g/dL    RDW 12.6 11.5 - 14.5 %    PLATELET 690 073 - 337 K/uL    MPV 9.6 8.9 - 12.9 FL    NRBC 0.0 0  WBC    ABSOLUTE NRBC 0.00 0.00 - 0.01 K/uL    NEUTROPHILS 61 32 - 75 %    LYMPHOCYTES 29 12 - 49 %    MONOCYTES 6 5 - 13 %    EOSINOPHILS 3 0 - 7 %    BASOPHILS 1 0 - 1 %    IMMATURE GRANULOCYTES 0 0.0 - 0.5 %    ABS. NEUTROPHILS 4.5 1.8 - 8.0 K/UL    ABS. LYMPHOCYTES 2.1 0.8 - 3.5 K/UL    ABS. MONOCYTES 0.4 0.0 - 1.0 K/UL    ABS. EOSINOPHILS 0.2 0.0 - 0.4 K/UL    ABS. BASOPHILS 0.1 0.0 - 0.1 K/UL    ABS. IMM.  GRANS. 0.0 0.00 - 0.04 K/UL    DF AUTOMATED METABOLIC PANEL, COMPREHENSIVE    Collection Time: 01/30/20  8:35 PM   Result Value Ref Range    Sodium 144 136 - 145 mmol/L    Potassium 3.9 3.5 - 5.1 mmol/L    Chloride 112 (H) 97 - 108 mmol/L    CO2 22 21 - 32 mmol/L    Anion gap 10 5 - 15 mmol/L    Glucose 158 (H) 65 - 100 mg/dL    BUN 8 6 - 20 MG/DL    Creatinine 0.64 (L) 0.70 - 1.30 MG/DL    BUN/Creatinine ratio 13 12 - 20      GFR est AA >60 >60 ml/min/1.73m2    GFR est non-AA >60 >60 ml/min/1.73m2    Calcium 8.5 8.5 - 10.1 MG/DL    Bilirubin, total 0.2 0.2 - 1.0 MG/DL    ALT (SGPT) 35 12 - 78 U/L    AST (SGOT) 26 15 - 37 U/L    Alk.  phosphatase 62 45 - 117 U/L    Protein, total 6.3 (L) 6.4 - 8.2 g/dL    Albumin 3.3 (L) 3.5 - 5.0 g/dL    Globulin 3.0 2.0 - 4.0 g/dL    A-G Ratio 1.1 1.1 - 2.2     TROPONIN I    Collection Time: 01/30/20  8:35 PM   Result Value Ref Range    Troponin-I, Qt. <0.05 <0.05 ng/mL   CK W/ REFLX CKMB    Collection Time: 01/30/20  8:35 PM   Result Value Ref Range     39 - 308 U/L   URINALYSIS W/ REFLEX CULTURE    Collection Time: 01/30/20  8:35 PM   Result Value Ref Range    Color YELLOW/STRAW      Appearance CLEAR CLEAR      Specific gravity 1.016 1.003 - 1.030      pH (UA) 6.0 5.0 - 8.0      Protein NEGATIVE  NEG mg/dL    Glucose NEGATIVE  NEG mg/dL    Ketone NEGATIVE  NEG mg/dL    Bilirubin NEGATIVE  NEG      Blood NEGATIVE  NEG      Urobilinogen 0.2 0.2 - 1.0 EU/dL    Nitrites NEGATIVE  NEG      Leukocyte Esterase NEGATIVE  NEG      WBC 0-4 0 - 4 /hpf    RBC 0-5 0 - 5 /hpf    Epithelial cells FEW FEW /lpf    Bacteria NEGATIVE  NEG /hpf    UA:UC IF INDICATED CULTURE NOT INDICATED BY UA RESULT CNI      Hyaline cast 0-2 0 - 5 /lpf   NT-PRO BNP    Collection Time: 01/30/20  8:35 PM   Result Value Ref Range    NT pro-BNP 62 <125 PG/ML   D DIMER    Collection Time: 01/30/20 10:26 PM   Result Value Ref Range    D-dimer 0.26 0.00 - 0.65 mg/L FEU   POC TROPONIN-I    Collection Time: 01/30/20 10:29 PM   Result Value Ref Range Troponin-I (POC) <0.04 0.00 - 0.08 ng/mL       Recent Labs     01/30/20 2035   WBC 7.2   HGB 14.9   HCT 43.0        Recent Labs     01/30/20 2035      K 3.9   *   CO2 22   BUN 8   CREA 0.64*   *   CA 8.5     Recent Labs     01/30/20 2035   SGOT 26   ALT 35   AP 62   TBILI 0.2   TP 6.3*   ALB 3.3*   GLOB 3.0       Recent Labs     01/30/20 2035   Mechele Mews <0.05       No results for input(s): INR, PTP, APTT, INREXT in the last 72 hours. No results for input(s): PH, PCO2, PO2 in the last 72 hours. Xr Chest Pa Lat    Result Date: 1/30/2020  IMPRESSION: No acute findings. Assessment & Plan:     Chest pain: non-cardiac in nature.  Multiple CAD risk factors  -trend biomarkers  -cardiology consulted in the ED, likely stress test in the AM  -check a1c and lipid panel  -pain control    Tobacco use:  -advised the patient to quit    Alcohol use: denies hx of withdrawal. Drinks 5 beers daily.  -advised to reduce alcohol intake    COPD:  -no evidence of bronchospasm    HTN:  -continue amlodipine and HCTZ    DVT ppx: SCDs  Code status: full  Disposition: home when ready    Signed By: Chandrika Batres MD     January 31, 2020

## 2020-01-31 NOTE — ED PROVIDER NOTES
EMERGENCY DEPARTMENT HISTORY AND PHYSICAL EXAM          Date: 1/30/2020  Patient Name: Shahbaz Perry    History of Presenting Illness     Chief Complaint   Patient presents with    Chest Pain     pt arrives via EMS from home with c/o substernal CP since 1900 with SOB    Shortness of Breath     pt states he felt SOB when SOB was 10/10, now pt states not constant       History Provided By: Patient    HPI: Shahbaz Perry is a 47 y.o. male, pmhx HTN, COPD, who presents EMS to the ED c/o CP    Pt notes he was feeding the dogs around 5:30 PM when he developed sharp, stabbing central CP lasted about 40 minutes. He had a second episode around 7:30 PM of nonradiating pain again lasting less than an hour; it started to improve after 2 nitro by EMS. Now feels like a stabbing 8/10 pain with mild SOB. Patient specifically denies any recent fevers, chills, nausea, vomiting, diarrhea, abd pain, urinary sxs, changes in BM, or headache but has noted cough the past couple weeks with green phlegm. Pt states his normal BP is 190/100 and he feels shaky and lightheaded and wheezy currently. PCP: Rylee, MD Colt    Allergies: NKDA  Social Hx: +tobacco, +EtOH, -Illicit Drugs    There are no other complaints, changes, or physical findings at this time. Current Outpatient Medications   Medication Sig Dispense Refill    methocarbamol (ROBAXIN) 750 mg tablet Take 750 mg by mouth four (4) times daily.  amLODIPine (NORVASC) 10 mg tablet Take 10 mg by mouth daily.  hydroCHLOROthiazide (MICROZIDE) 12.5 mg capsule Take 25 mg by mouth daily.  acetaminophen (TYLENOL) 325 mg tablet Take 500 mg by mouth every four (4) hours as needed for Pain.  baclofen (LIORESAL) 10 mg tablet Take 10 mg by mouth three (3) times daily.          Past History     Past Medical History:  Past Medical History:   Diagnosis Date    Chronic obstructive pulmonary disease ([address]     Hypertension        Past Surgical History:  Past Surgical History: Procedure Laterality Date    HX HERNIA REPAIR  2004    HX ORTHOPAEDIC      carpal tunnel bilateral, lumbar unknown to pt which ones       Family History:  History reviewed. No pertinent family history. Social History:  Social History     Tobacco Use    Smoking status: Current Every Day Smoker     Packs/day: 1.50    Smokeless tobacco: Never Used   Substance Use Topics    Alcohol use: Yes     Alcohol/week: 6.0 - 8.0 standard drinks     Types: 6 - 8 Cans of beer per week     Comment: daily drinker    Drug use: Not on file       Allergies:  No Known Allergies      Review of Systems   Review of Systems   Constitutional: Negative for activity change, appetite change, chills, fever and unexpected weight change. HENT: Negative for congestion. Eyes: Negative for pain and visual disturbance. Respiratory: Positive for cough and shortness of breath. Cardiovascular: Positive for chest pain. Negative for palpitations and leg swelling. Gastrointestinal: Negative for abdominal pain, diarrhea, nausea and vomiting. Genitourinary: Negative for dysuria. Musculoskeletal: Negative for back pain. Skin: Negative for rash. Neurological: Negative for headaches. Physical Exam   Physical Exam  Vitals signs and nursing note reviewed. Constitutional:       Appearance: He is well-developed. He is not diaphoretic. Comments: Middle aged male with normal vital signs currently in mild distress   HENT:      Head: Normocephalic and atraumatic. Eyes:      General:         Right eye: No discharge. Left eye: No discharge. Conjunctiva/sclera: Conjunctivae normal.      Pupils: Pupils are equal, round, and reactive to light. Neck:      Musculoskeletal: Normal range of motion and neck supple. Cardiovascular:      Rate and Rhythm: Normal rate and regular rhythm. Heart sounds: Normal heart sounds. No murmur. No friction rub. No gallop.     Pulmonary:      Effort: Pulmonary effort is normal. No accessory muscle usage or respiratory distress. Breath sounds: Normal breath sounds. No decreased breath sounds, wheezing, rhonchi or rales. Abdominal:      General: Bowel sounds are normal. There is no distension. Palpations: Abdomen is soft. Tenderness: There is no abdominal tenderness. Musculoskeletal: Normal range of motion. Right lower leg: No edema. Left lower leg: No edema. Skin:     General: Skin is warm and dry. Findings: No rash. Neurological:      Mental Status: He is alert and oriented to person, place, and time. Cranial Nerves: No cranial nerve deficit. Motor: No abnormal muscle tone. Diagnostic Study Results     Labs -     Recent Results (from the past 12 hour(s))   EKG, 12 LEAD, INITIAL    Collection Time: 01/30/20  8:10 PM   Result Value Ref Range    Ventricular Rate 107 BPM    Atrial Rate 107 BPM    P-R Interval 162 ms    QRS Duration 86 ms    Q-T Interval 350 ms    QTC Calculation (Bezet) 467 ms    Calculated P Axis 53 degrees    Calculated R Axis 0 degrees    Calculated T Axis 35 degrees    Diagnosis       Sinus tachycardia  Septal infarct , age undetermined  No previous ECGs available     CBC WITH AUTOMATED DIFF    Collection Time: 01/30/20  8:35 PM   Result Value Ref Range    WBC 7.2 4.1 - 11.1 K/uL    RBC 4.59 4. 10 - 5.70 M/uL    HGB 14.9 12.1 - 17.0 g/dL    HCT 43.0 36.6 - 50.3 %    MCV 93.7 80.0 - 99.0 FL    MCH 32.5 26.0 - 34.0 PG    MCHC 34.7 30.0 - 36.5 g/dL    RDW 12.6 11.5 - 14.5 %    PLATELET 903 748 - 132 K/uL    MPV 9.6 8.9 - 12.9 FL    NRBC 0.0 0  WBC    ABSOLUTE NRBC 0.00 0.00 - 0.01 K/uL    NEUTROPHILS 61 32 - 75 %    LYMPHOCYTES 29 12 - 49 %    MONOCYTES 6 5 - 13 %    EOSINOPHILS 3 0 - 7 %    BASOPHILS 1 0 - 1 %    IMMATURE GRANULOCYTES 0 0.0 - 0.5 %    ABS. NEUTROPHILS 4.5 1.8 - 8.0 K/UL    ABS. LYMPHOCYTES 2.1 0.8 - 3.5 K/UL    ABS. MONOCYTES 0.4 0.0 - 1.0 K/UL    ABS. EOSINOPHILS 0.2 0.0 - 0.4 K/UL    ABS.  BASOPHILS 0.1 0.0 - 0.1 K/UL    ABS. IMM. GRANS. 0.0 0.00 - 0.04 K/UL    DF AUTOMATED     METABOLIC PANEL, COMPREHENSIVE    Collection Time: 01/30/20  8:35 PM   Result Value Ref Range    Sodium 144 136 - 145 mmol/L    Potassium 3.9 3.5 - 5.1 mmol/L    Chloride 112 (H) 97 - 108 mmol/L    CO2 22 21 - 32 mmol/L    Anion gap 10 5 - 15 mmol/L    Glucose 158 (H) 65 - 100 mg/dL    BUN 8 6 - 20 MG/DL    Creatinine 0.64 (L) 0.70 - 1.30 MG/DL    BUN/Creatinine ratio 13 12 - 20      GFR est AA >60 >60 ml/min/1.73m2    GFR est non-AA >60 >60 ml/min/1.73m2    Calcium 8.5 8.5 - 10.1 MG/DL    Bilirubin, total 0.2 0.2 - 1.0 MG/DL    ALT (SGPT) 35 12 - 78 U/L    AST (SGOT) 26 15 - 37 U/L    Alk.  phosphatase 62 45 - 117 U/L    Protein, total 6.3 (L) 6.4 - 8.2 g/dL    Albumin 3.3 (L) 3.5 - 5.0 g/dL    Globulin 3.0 2.0 - 4.0 g/dL    A-G Ratio 1.1 1.1 - 2.2     TROPONIN I    Collection Time: 01/30/20  8:35 PM   Result Value Ref Range    Troponin-I, Qt. <0.05 <0.05 ng/mL   CK W/ REFLX CKMB    Collection Time: 01/30/20  8:35 PM   Result Value Ref Range     39 - 308 U/L   URINALYSIS W/ REFLEX CULTURE    Collection Time: 01/30/20  8:35 PM   Result Value Ref Range    Color YELLOW/STRAW      Appearance CLEAR CLEAR      Specific gravity 1.016 1.003 - 1.030      pH (UA) 6.0 5.0 - 8.0      Protein NEGATIVE  NEG mg/dL    Glucose NEGATIVE  NEG mg/dL    Ketone NEGATIVE  NEG mg/dL    Bilirubin NEGATIVE  NEG      Blood NEGATIVE  NEG      Urobilinogen 0.2 0.2 - 1.0 EU/dL    Nitrites NEGATIVE  NEG      Leukocyte Esterase NEGATIVE  NEG      WBC 0-4 0 - 4 /hpf    RBC 0-5 0 - 5 /hpf    Epithelial cells FEW FEW /lpf    Bacteria NEGATIVE  NEG /hpf    UA:UC IF INDICATED CULTURE NOT INDICATED BY UA RESULT CNI      Hyaline cast 0-2 0 - 5 /lpf   NT-PRO BNP    Collection Time: 01/30/20  8:35 PM   Result Value Ref Range    NT pro-BNP 62 <125 PG/ML   D DIMER    Collection Time: 01/30/20 10:26 PM   Result Value Ref Range    D-dimer 0.26 0.00 - 0.65 mg/L FEU   POC TROPONIN-I    Collection Time: 01/30/20 10:29 PM   Result Value Ref Range    Troponin-I (POC) <0.04 0.00 - 0.08 ng/mL       Radiologic Studies -   XR CHEST PA LAT   Final Result   IMPRESSION:   No acute findings. CT Results  (Last 48 hours)    None        CXR Results  (Last 48 hours)               01/30/20 2044  XR CHEST PA LAT Final result    Impression:  IMPRESSION:   No acute findings. Narrative:  History: Chest pain. Frontal and lateral views of the chest show clear lungs. The heart, mediastinum   and pulmonary vasculature are normal.  There are mild degenerative changes of   the spine. There is evidence of prior cervical spine surgery. Medical Decision Making   I am the first provider for this patient. I reviewed the vital signs, available nursing notes, past medical history, past surgical history, family history and social history. Vital Signs-Reviewed the patient's vital signs. Patient Vitals for the past 12 hrs:   Temp Pulse Resp BP SpO2   01/30/20 2300  90 17 133/87 92 %   01/30/20 2049  99 19  96 %   01/30/20 2047    119/78    01/30/20 2021 97.8 °F (36.6 °C) (!) 108 18 103/63 96 %       Pulse Oximetry Analysis - 96% on RA    Cardiac Monitor:   Rate: 99bpm  Rhythm: Normal Sinus Rhythm      Records Reviewed: Nursing Notes, Old Medical Records, Ambulance Run Sheet, Previous Radiology Studies and Previous Laboratory Studies    Provider Notes (Medical Decision Making):   MDM: Middle-aged smoker with history of hypertension presenting with concerning chest pain. No prior EKGs for this patient given his past medical history he has moderate risk and will initiate repeat troponin monitoring. Patient states he feels wheezy is some chest tightness will start albuterol treatments if this helps his symptoms. ED Course:   Initial assessment performed.  The patients presenting problems have been discussed, and they are in agreement with the care plan formulated and outlined with them. I have encouraged them to ask questions as they arise throughout their visit. EKG interpretation: (Preliminary)  Rhythm: Sinus tachycardia at a regular rate of 107; normal axis; normal MN; normal QRS; Q waves noted septally, otherwise normal EKG. No prior EKGs to compare. Repeat EKG at 10:21 PM  Sinus rhythm at a rate of 87 bpm; normal axis; normal MN; normal QRS; septal abnormality continues in the R wave is becoming progressively more delayed. PROGRESS NOTE:  10:45 PM  Pt reevaluated and is currently using albuterol. He states it does help and he does feel a little better but he was also given morphine. Repeat EKG is concerning giving the slow progression of those R waves with Axis does not change into lead V5. Given his risk factors will discuss with internal medicine for admission. CONSULT NOTE:   1130 PM  Marilu De Los Santos MD spoke with Dr. Alisha Arita,   Specialty: Cardiology  Discussed pt's hx, disposition, and available diagnostic and imaging results. Reviewed care plans. Consultant agrees with plans as outlined. Options discussed regarding inpatient versus outpatient care and provided to the patient. Will admit for evaluation tomorrow    CONSULT NOTE:   11:45 PM  Marilu De Los Santos MD spoke with  Dr. Chanda Mendoza,   Specialty: Hospitalist  Discussed pt's hx, disposition, and available diagnostic and imaging results. Reviewed care plans. Consultant agrees with plans as outlined. He will evaluate patient for admission.        Critical Care Time:   CRITICAL CARE NOTE :  11:45 PM  IMPENDING DETERIORATION -Airway, Respiratory, Cardiovascular, CNS, Metabolic, Renal and Hepatic  ASSOCIATED RISK FACTORS - Hypotension, Shock, Hypoxia, Dysrhythmia and Metabolic changes  MANAGEMENT- Bedside Assessment and Supervision of Care  INTERPRETATION -  Xrays, ECG and Blood Pressure  INTERVENTIONS - hemodynamic mngmt and Metobolic interventions  CASE REVIEW - Hospitalist, Medical Sub-Specialist, Nursing and Family  TREATMENT RESPONSE -Stable  PERFORMED BY - Self    NOTES   :  I have spent 45 minutes of critical care time involved in lab review, consultations with specialist, family decision- making, bedside attention and documentation. During this entire length of time I was immediately available to the patient. Martine Abraham. MD Julieta        Diagnosis     Clinical Impression:   1. Atypical chest pain    2. Abnormal EKG        PLAN:  Discussed with him internal medicine and cardiology for admission and further evaluation      Please note, this dictation was completed with [employer], the computer voice recognition software. Quite often unanticipated grammatical, syntax, homophones, and other interpretive errors are inadvertently transcribed by the computer software. Please disregard these errors. Please excuse any errors that have escaped final proof reading.

## 2020-01-31 NOTE — PROGRESS NOTES
Hospitalist Progress Note    NAME: Judy Kent   :  1965   MRN:  [id number]     I reviewed with the resident the medical history and the resident's findings on the physical examination. I discussed with the resident the patient's diagnosis and concur with the plan. Interim Hospital Summary: 47 y.o. male whom presented on 2020 with chest pain     Assessment / Plan:  Chest pain  - mid sternal chest pain, no nausea, no radiating pain. Pt has been experiencing chest pain on and off for the past a few months    Cardiology has been consulted    Recommend nursing staff to try SL NTG    Troponin (-)    EKG NSR    CXR (-)    Echo pending    Started on ASA    Hypertension  - continue with norvasc and HCTZ    BB was added with parameter      Hyperlipidemia  - continue with statin    Tobacco abuse (smokes 2  Packs/day)  - continue with nicotine patch    Discussed about importance of smoking cessation which pt agreed    ETOH abuse  - discussed about importance of quit drinking which pt agreed. Drinks > 5 beers/day    No hx of withdrawal    Folate and thiamine daily    COPD not in exacerbatopm  - duoneb PRN    Chronic low back pain  - pt has been following up with orthopedic physician    Continue to follow up with orthopedic physician as outpatient    May take tyleonol or motrin prn; pt was not on opiate therapy POA    BMI 31.00 kg/m²       Code status: Full  Prophylaxis: Lovenox  Recommended Disposition: Home w/Family     Subjective:     Chief Complaint / Reason for Physician Visit  \"I am still having the chest pain\". Discussed with RN events overnight.      Review of Systems:  Symptom Y/N Comments  Symptom Y/N Comments   Fever/Chills n   Chest Pain y    Poor Appetite    Edema     Cough    Abdominal Pain     Sputum    Joint Pain     SOB/ROTHMAN n   Pruritis/Rash     Nausea/vomit n   Tolerating PT/OT     Diarrhea n   Tolerating Diet     Constipation n   Other       Could NOT obtain due to:      Objective: VITALS:   Last 24hrs VS reviewed since prior progress note. Most recent are:  Patient Vitals for the past 24 hrs:   Temp Pulse Resp BP SpO2   01/31/20 0400  79      01/31/20 0324 97.9 °F (36.6 °C) 78 18 (!) 141/99 98 %   01/31/20 0315 98.2 °F (36.8 °C) 83 17 (!) 145/94 96 %   01/31/20 0202 98.2 °F (36.8 °C) 76 16 140/84 93 %   01/31/20 0100 98.1 °F (36.7 °C) 84 15 129/82 90 %   01/30/20 2300  90 17 133/87 92 %   01/30/20 2049  99 19  96 %   01/30/20 2047    119/78    01/30/20 2021 97.8 °F (36.6 °C) (!) 108 18 103/63 96 %       Intake/Output Summary (Last 24 hours) at 1/31/2020 0714  Last data filed at 1/30/2020 2327  Gross per 24 hour   Intake 1000 ml   Output    Net 1000 ml        PHYSICAL EXAM:  General: WD, WN. Alert, cooperative, no acute distress    EENT:  EOMI. Anicteric sclerae. MMM  Resp:  CTA bilaterally, no wheezing or rales. No accessory muscle use  CV:  Regular  rhythm,  No edema, substernal chest pain  GI:  Soft, Non distended, Non tender. +Bowel sounds  Neurologic:  Alert and oriented X 3, normal speech,   Psych:   Good insight. Not anxious nor agitated  Skin:  No rashes. No jaundice    Reviewed most current lab test results and cultures  YES  Reviewed most current radiology test results   YES  Review and summation of old records today    NO  Reviewed patient's current orders and MAR    YES  PMH/ reviewed - no change compared to H&P  ________________________________________________________________________  Care Plan discussed with:    Comments   Patient y    Family  y Wife at bedside   RN y    Care Manager     Consultant                        Multidiciplinary team rounds were held today with , nursing, pharmacist and clinical coordinator. Patient's plan of care was discussed; medications were reviewed and discharge planning was addressed.        ________________________________________________________________________  Fer Queen NP     Procedures: see electronic medical records for all procedures/Xrays and details which were not copied into this note but were reviewed prior to creation of Plan. LABS:  I reviewed today's most current labs and imaging studies.   Pertinent labs include:  Recent Labs     01/30/20 2035   WBC 7.2   HGB 14.9   HCT 43.0        Recent Labs     01/30/20 2035      K 3.9   *   CO2 22   *   BUN 8   CREA 0.64*   CA 8.5   ALB 3.3*   TBILI 0.2   SGOT 26   ALT 35       Signed: )Leeanna See, NP

## 2020-01-31 NOTE — PHYSICIAN ADVISORY
Letter of Status Determination:  
Recommend hospitalization status upgraded from INPATIENT  to OBSERVATION Status Pt Name:  Barbara Mcarthur MR#  
FREDDY # [id number] / 
[id number] Payor: Fadumo Craft / Plan: [facility] / Product Type: PPO /   
CSN#  [id number] Room and Hospital  2156/01  @ [facility] Hospitalization date  1/30/2020  8:07 PM  
Current Attending Physician  Kip Villalobos MD  
Principal diagnosis  Chest pain [R07.9] Chest pain [R07.9] Clinicals  47 y.o. y.o  male hospitalized with above diagnosis  
mid sternal chest pain, no nausea, no radiating pain. Pt has been experiencing chest pain on and off for the past a few months Cardiology has been consulted Recommend nursing staff to try SL NTG Troponin (-) 
  EKG NSR 
  CXR (-) Echo pending Started on ASA 
  
  
[place] (MCG) criteria Does  NOT apply STATUS DETERMINATION  OBSERVATION Additional comments Payor: Fadumo Craft / Plan: [address] [place] [address] / Product Type: PPO /   
  
 
 
Dominic Helm [address] [facility] T: [phone]    [email or web address]

## 2020-01-31 NOTE — PROGRESS NOTES
Clifford Ram is recovering post JOSELINE/PCI to diagonal.  Right radial site dressing is CDI without swelling or bleeding. VSS. Rhythm NSR. Clifford Ram denies complaints at this time. If recovery continues to progress without complication, discharge is planned for tomorrow.       Cachorro LOZANOVVFCGOYO CARNEY  MSN, RN, AG-ACNP-BC

## 2020-01-31 NOTE — ED TRIAGE NOTES
Pt arrives via EMS from home with c/o stubsternal CP with SOB since 1900. Pt states pain is non radiating, per EMS 2 nitros given and 324 ASA    Pt denies N/V/D, fever, chills.  Pt states he has had a dry cough, with intermittent productive x2 weeks    Pt placed x3 on monitor,bed in low position, call bell in reach

## 2020-01-31 NOTE — PROGRESS NOTES
Bedside shift change report given to Isi Reeves RN (incoming nurse) by Harris Dubose (outgoing nurse) on [facility]. Report included the following information SBAR, Kardex and MAR.

## 2020-01-31 NOTE — CARDIO/PULMONARY
Cardiac Rehab Note: chart review Smoking history assessed. Patient is a current smoker. Smoking Cessation Program link has been added to the AVS. CAD education folder, with heart heathy diet, warning signs, heart facts, catheterization brochure, and out patient cardiac rehab program provided to Nay Mason on 1/31/20 Discussed Heart Healthy/Low Sodium (less than 2000 mg) diet. Reviewed the importance of medication compliance, follow up appointments with cardiologist, signs and symptoms of angina, and what to report to physician after discharge. Emphasized the value of cardiac rehab. Discussed Cardiac Rehab Program format, benefits, and encouraged enrollment to assist with risk modification and management. Will follow up with patient for OP cardiac rehab program. 
 
Nay Mason verbalized understanding with questions answered.   Nadia Whitney RN

## 2020-01-31 NOTE — CONSULTS
[facility]     Date of  Admission: 1/30/2020  8:07 PM     Admission type:Emergency    Consult for: Chest Pain  Consult by: Dr. Shaina Gaxiola     Subjective:     León Javier is a 47 y.o. male  with PHM significant for uncontrolled HTN, COPD, ETOH use, 2.5 PPD smoker, admitted for Chest Pain. Per ED provider note, the patient arrived via EMS from home with complaints of substernal CP associated with SOB. Upon assessment, the patient reports the pain has been intermittent over the past several months accompanied by SOB. He describes the pain as a stabbing pain, mid-sternum, radiating to left and right upper chest. SL nitro does relieve the pain. Patient also reports uncontrolled hypertension over several months, despite changes to current medications. Patient does not have a listed cardiologist or previous cardiac work-up. PCP has been managing HTN. Cardiac risk factors: smoking/ tobacco exposure, family history, dyslipidemia, male gender, hypertension, stress, ETOH use. Patient Active Problem List    Diagnosis Date Noted    Chest pain 01/30/2020      Other, MD Colt  Past Medical History:   Diagnosis Date    Chronic obstructive pulmonary disease ([facility] [address]     Hypertension       Social History     Socioeconomic History    Marital status:      Spouse name: Not on file    Number of children: Not on file    Years of education: Not on file    Highest education level: Not on file   Tobacco Use    Smoking status: Current Every Day Smoker     Packs/day: 1.50    Smokeless tobacco: Never Used   Substance and Sexual Activity    Alcohol use: Yes     Alcohol/week: 6.0 - 8.0 standard drinks     Types: 6 - 8 Cans of beer per week     Comment: daily drinker     No Known Allergies   History reviewed. No pertinent family history.    Current Facility-Administered Medications   Medication Dose Route Frequency    fentaNYL citrate (PF) injection 25 mcg  25 mcg IntraVENous Q4H PRN    [Held by provider] oxyCODONE IR (ROXICODONE) tablet 5 mg  5 mg Oral Q4H PRN    nicotine (NICODERM CQ) 14 mg/24 hr patch 1 Patch  1 Patch TransDERmal DAILY    thiamine mononitrate (B-1) tablet 100 mg  100 mg Oral DAILY    hydroCHLOROthiazide (HYDRODIURIL) tablet 25 mg  25 mg Oral DAILY    atorvastatin (LIPITOR) tablet 10 mg  10 mg Oral QHS    folic acid (FOLVITE) tablet 1 mg  1 mg Oral DAILY    nitroglycerin (NITROBID) 2 % ointment 1 Inch  1 Inch Topical BID PRN    aspirin chewable tablet 81 mg  81 mg Oral DAILY    metoprolol tartrate (LOPRESSOR) tablet 12.5 mg  12.5 mg Oral Q12H    albuterol-ipratropium (DUO-NEB) 2.5 MG-0.5 MG/3 ML  3 mL Nebulization Q6H PRN    enoxaparin (LOVENOX) injection 40 mg  40 mg SubCUTAneous Q24H    0.9% sodium chloride infusion  100 mL/hr IntraVENous CONTINUOUS    sodium chloride (NS) flush 5-40 mL  5-40 mL IntraVENous Q8H    sodium chloride (NS) flush 5-40 mL  5-40 mL IntraVENous PRN    acetaminophen (TYLENOL) tablet 650 mg  650 mg Oral Q4H PRN    ondansetron (ZOFRAN) injection 4 mg  4 mg IntraVENous Q4H PRN    amLODIPine (NORVASC) tablet 10 mg  10 mg Oral DAILY        Review of Symptoms:   11 systems reviewed, negative other than as stated in the HPI        Objective:      Visit Vitals  BP (!) 151/93   Pulse 60   Temp 98.7 °F (37.1 °C)   Resp 20   Ht 5' 3\" (1.6 m)   Wt 79.4 kg (175 lb 0.7 oz)   SpO2 95%   BMI 31.01 kg/m²       Physical:   General: anxious, well-nourished, middle aged  male in NAD  Heart: RRR, no m/S3/JVD, no carotid bruits   Lungs: clear diminshed posteriorly bases, RN, dry cough   Abdomen: Soft, +BS, NTND   Extremities: LE nieves +DP/PT, no edema   Neurologic: Grossly normal, mildly anxious, A x O x 3    Data Review:   Recent Labs     01/30/20 2035   WBC 7.2   HGB 14.9   HCT 43.0        Recent Labs     01/31/20  1117 01/30/20  2035    144   K 4.3 3.9    112*   CO2 29 22   GLU 92 158*   BUN 12 8   CREA 0. 71 0.64*   CA 8.5 8.5   ALB  --  3.3*   TBILI  --  0.2   SGOT  --  26   ALT  --  35       Recent Labs     01/31/20  0402 01/30/20 2035   TROIQ <0.05 <0.05         Intake/Output Summary (Last 24 hours) at 1/31/2020 1340  Last data filed at 1/30/2020 2327  Gross per 24 hour   Intake 1000 ml   Output    Net 1000 ml        Cardiographics    Telemetry: NSR  ECG: NSR  Echocardiogram: Normal cavity size and systolic function (ejection fraction normal). Mild concentric hypertrophy. Estimated left ventricular ejection fraction is 60 - 65%. CXRAY: No acute process. Assessment:       Active Problems:    Chest pain (1/30/2020)         Plan:   Unstable Angina: (troponin negative x 2)  -EKG noted  -Monitor telemetry  -CMP, CBC, unremarkable  -Lipid panel resulted  -Nitropaste 1 inch BID CP  -Start  cc/hr   -Monitor I/O  -ASA 81 mg PO daily  -Echo resulted  -Ddimer negative  -NPO  -Consent for cardiac catheterization   -Cardiac catheterization today 1/31/2020 to r/o ischemic CAD. HLD: New diagnosis  -Start Lipitor 20 mg PO qHs    HTN uncontrolled:   -Start metoprolol 12.5mg PO q12H  -Continue  norvasc 10 mg PO daily  -Continue hydrochlorothiazide 25mg PO daily     CAD: New Diagnosis  -Start Brilinta 90mg PO q12H  -Continue ASA 81mg PO  -Start lipitor 20mg PO qHs  -Start metoprolol 12.5 mg PO q12H  -Patient counseled concerning heart healthy diet and exercise.  -Cardiac rehab to see patient     Nicotine use:  -Education and counseling provided concerning importance of ceasing nicotine use. -Patient placed on nicotine patch by attending    Hansa Perea NP   MSN, RN, AG-ACNP-BC    Patient seen and examined by me with nurse practitioner. I personally performed all components of the history, physical, and medical decision making and agree with the assessment and plan as noted. S/p carmen Grimes MD

## 2020-02-01 VITALS
OXYGEN SATURATION: 97 % | TEMPERATURE: 97.5 F | WEIGHT: 175.04 LBS | BODY MASS INDEX: 31.02 KG/M2 | HEART RATE: 79 BPM | DIASTOLIC BLOOD PRESSURE: 102 MMHG | SYSTOLIC BLOOD PRESSURE: 156 MMHG | RESPIRATION RATE: 18 BRPM | HEIGHT: 63 IN

## 2020-02-01 PROBLEM — G89.29 CHRONIC LOW BACK PAIN: Status: ACTIVE | Noted: 2020-02-01

## 2020-02-01 PROBLEM — F10.10 ETOH ABUSE: Status: ACTIVE | Noted: 2020-02-01

## 2020-02-01 PROBLEM — M54.50 CHRONIC LOW BACK PAIN: Status: ACTIVE | Noted: 2020-02-01

## 2020-02-01 PROBLEM — E78.5 HYPERLIPIDEMIA: Status: ACTIVE | Noted: 2020-02-01

## 2020-02-01 PROBLEM — Z72.0 TOBACCO ABUSE: Status: ACTIVE | Noted: 2020-02-01

## 2020-02-01 LAB
ANION GAP SERPL CALC-SCNC: 5 MMOL/L (ref 5–15)
BUN SERPL-MCNC: 13 MG/DL (ref 6–20)
BUN/CREAT SERPL: 18 (ref 12–20)
CALCIUM SERPL-MCNC: 8.8 MG/DL (ref 8.5–10.1)
CHLORIDE SERPL-SCNC: 108 MMOL/L (ref 97–108)
CO2 SERPL-SCNC: 26 MMOL/L (ref 21–32)
CREAT SERPL-MCNC: 0.74 MG/DL (ref 0.7–1.3)
GLUCOSE SERPL-MCNC: 104 MG/DL (ref 65–100)
POTASSIUM SERPL-SCNC: 4.2 MMOL/L (ref 3.5–5.1)
SODIUM SERPL-SCNC: 139 MMOL/L (ref 136–145)

## 2020-02-01 PROCEDURE — 74011250637 HC RX REV CODE- 250/637: Performed by: HOSPITALIST

## 2020-02-01 PROCEDURE — 36415 COLL VENOUS BLD VENIPUNCTURE: CPT

## 2020-02-01 PROCEDURE — 80048 BASIC METABOLIC PNL TOTAL CA: CPT

## 2020-02-01 PROCEDURE — 74011250637 HC RX REV CODE- 250/637: Performed by: NURSE PRACTITIONER

## 2020-02-01 PROCEDURE — 74011250636 HC RX REV CODE- 250/636: Performed by: HOSPITALIST

## 2020-02-01 RX ORDER — ASPIRIN 325 MG/1
100 TABLET, FILM COATED ORAL DAILY
Qty: 30 TAB | Refills: 0 | Status: SHIPPED | OUTPATIENT
Start: 2020-02-01 | End: 2020-12-01

## 2020-02-01 RX ORDER — METOPROLOL TARTRATE 50 MG/1
50 TABLET ORAL EVERY 12 HOURS
Status: DISCONTINUED | OUTPATIENT
Start: 2020-02-01 | End: 2020-02-01 | Stop reason: HOSPADM

## 2020-02-01 RX ORDER — GUAIFENESIN 100 MG/5ML
81 LIQUID (ML) ORAL DAILY
Qty: 30 TAB | Refills: 0 | Status: SHIPPED | OUTPATIENT
Start: 2020-02-01

## 2020-02-01 RX ORDER — IBUPROFEN 200 MG
1 TABLET ORAL DAILY
Qty: 30 PATCH | Refills: 0 | Status: SHIPPED | OUTPATIENT
Start: 2020-02-01 | End: 2020-03-02

## 2020-02-01 RX ORDER — FOLIC ACID 1 MG/1
1 TABLET ORAL DAILY
Qty: 30 TAB | Refills: 0 | Status: SHIPPED | OUTPATIENT
Start: 2020-02-01 | End: 2020-12-01

## 2020-02-01 RX ORDER — ATORVASTATIN CALCIUM 20 MG/1
20 TABLET, FILM COATED ORAL
Qty: 30 TAB | Refills: 0 | Status: SHIPPED | OUTPATIENT
Start: 2020-02-01 | End: 2020-03-03 | Stop reason: SDUPTHER

## 2020-02-01 RX ORDER — METOPROLOL TARTRATE 50 MG/1
50 TABLET ORAL EVERY 12 HOURS
Qty: 60 TAB | Refills: 0 | Status: SHIPPED | OUTPATIENT
Start: 2020-02-01 | End: 2020-03-03 | Stop reason: SDUPTHER

## 2020-02-01 RX ADMIN — FENTANYL CITRATE 25 MCG: 50 INJECTION INTRAMUSCULAR; INTRAVENOUS at 08:19

## 2020-02-01 RX ADMIN — AMLODIPINE BESYLATE 10 MG: 5 TABLET ORAL at 08:49

## 2020-02-01 RX ADMIN — TICAGRELOR 90 MG: 90 TABLET ORAL at 08:50

## 2020-02-01 RX ADMIN — METOPROLOL TARTRATE 50 MG: 50 TABLET, FILM COATED ORAL at 08:48

## 2020-02-01 RX ADMIN — HYDROCHLOROTHIAZIDE 25 MG: 25 TABLET ORAL at 08:49

## 2020-02-01 RX ADMIN — ASPIRIN 81 MG 81 MG: 81 TABLET ORAL at 08:49

## 2020-02-01 RX ADMIN — Medication 10 ML: at 06:00

## 2020-02-01 RX ADMIN — FOLIC ACID 1 MG: 1 TABLET ORAL at 08:48

## 2020-02-01 RX ADMIN — Medication 100 MG: at 08:50

## 2020-02-01 NOTE — DISCHARGE SUMMARY
Hospitalist Discharge Summary     Patient ID:  Gilbert Cardoso  [id number]  62 y.o.  1965    PCP on record: Colt Mckee MD    Admit date: 1/30/2020  Discharge date and time: 2/1/2020      DISCHARGE DIAGNOSIS:  Chest pain  CAD, s/p cath with PCI  Hypertension  Hyperlipidemia  Tobacco abuse (smokes 2  Packs/day)  ETOH abuse  COPD not in exacerbation  Chronic low back pain    CONSULTATIONS:  IP CONSULT TO CARDIOLOGY    Excerpted HPI from H&P of Jasmyn Ramachandran MD:  47 y.o man with HTN, COPD, tobacco use, who presents with chest pain. Symptoms began around 5:30 PM. He describes pain at the bottom of his chest extending to both sides. It was severe. He was feeding his dogs when it happened. He had similar episodes in the past but not as severe. The episode lasted about 45 minutes, but then recurred a few hours later. He denies dyspnea or fever. He has a dry cough that has been present for months.    ______________________________________________________________________  DISCHARGE SUMMARY/HOSPITAL COURSE:  for full details see H&P, daily progress notes, labs, consult notes. 47year old male admitted with chest pain which has been going on and off for the past a few months. Pain was relived with SL NTG. Pt went under left heart cath, had JOSELINE/PCI to diagonal. Pt denies any discomfort this morning. We dicussed about taking medications as directed and follow up with Dr. Ga Valenzuela, cardiologist as outpatient. We also discussed about importance of cessation from alcohol and tobacco which pt agreed. Pt is medically stable to discharge to home. Chest pain  ACS  CAD, s/p PCI  Hypertension  - mid sternal chest pain, no nausea, no radiating pain. Pt has been experiencing chest pain on and off for the past a few months. Pain relieved with SL NTG    Troponin (-)    EKG NSR    CXR (-)    S/p left heart cath with PCI    Continue to take daily ASA, brilnta, norvasc, HCTZ, and metoprolol.     Echo: Mild concentric hypertrophy. Estimated left ventricular ejection fraction is 60 - 65%.     Hyperlipidemia  - continue with statin     Tobacco abuse (smokes 2  Packs/day)  - continue with nicotine patch    Discussed about importance of smoking cessation which pt agreed     ETOH abuse  - discussed about importance of quit drinking which pt agreed. Drinks > 5 beers/day    No hx of withdrawal    continue with Folate and thiamine daily     COPD not in exacerbattion  - albuterol HFA PRN     Chronic low back pain  - pt has been following up with orthopedic physician    Continue to follow up with orthopedic physician as outpatient    May take tylenol PRN    _______________________________________________________________________  Patient seen and examined by me on discharge day. Pertinent Findings:  Gen:    Not in distress  Chest: Clear lungs  CVS:   Regular rhythm. No edema  Abd:  Soft, not distended, not tender  Neuro:  Alert, orient x 4  _______________________________________________________________________  DISCHARGE MEDICATIONS:   Current Discharge Medication List      START taking these medications    Details   nicotine (NICODERM CQ) 14 mg/24 hr patch 1 Patch by TransDERmal route daily for 30 days. Qty: 30 Patch, Refills: 0      thiamine mononitrate (B-1) 100 mg tablet Take 1 Tab by mouth daily. Qty: 30 Tab, Refills: 0      folic acid (FOLVITE) 1 mg tablet Take 1 Tab by mouth daily. Qty: 30 Tab, Refills: 0      aspirin 81 mg chewable tablet Take 1 Tab by mouth daily. Qty: 30 Tab, Refills: 0      atorvastatin (LIPITOR) 20 mg tablet Take 1 Tab by mouth nightly. Qty: 30 Tab, Refills: 0      ticagrelor (BRILINTA) 90 mg tablet Take 1 Tab by mouth every twelve (12) hours every twelve (12) hours. Qty: 60 Tab, Refills: 0      metoprolol tartrate (LOPRESSOR) 50 mg tablet Take 1 Tab by mouth every twelve (12) hours.   Qty: 60 Tab, Refills: 0         CONTINUE these medications which have NOT CHANGED    Details   methocarbamol (ROBAXIN) 750 mg tablet Take 750 mg by mouth four (4) times daily. amLODIPine (NORVASC) 10 mg tablet Take 10 mg by mouth daily. hydroCHLOROthiazide (MICROZIDE) 12.5 mg capsule Take 25 mg by mouth daily. acetaminophen (TYLENOL) 325 mg tablet Take 500 mg by mouth every four (4) hours as needed for Pain. baclofen (LIORESAL) 10 mg tablet Take 10 mg by mouth three (3) times daily. My Recommended Diet, Activity, Wound Care, and follow-up labs are listed in the patient's Discharge Insturctions which I have personally completed and reviewed.     _______________________________________________________________________  DISPOSITION:    Home with Family: y   Home with HH/PT/OT/RN:    SNF/LTC:    TRISHA:    OTHER:        Condition at Discharge:  Stable  _______________________________________________________________________  Follow up with:   PCP : Colt Mckee MD  Follow-up Information     Follow up With Specialties Details Why Contact Info    Edy Stoner MD Cardiology, Vascular Surgery, General and Vascular Surgery Schedule an appointment as soon as possible for a visit in 2 weeks Cardiology follow-up Kait [phone]       [facility]  Schedule an appointment as soon as possible for a visit in 1 week hospital follow-up [address] [place] [address] [phone]              Total time in minutes spent coordinating this discharge (includes going over instructions, follow-up, prescriptions, and preparing report for sign off to her PCP) : 40 minutes    Signed:  Leeanna Huggins NP

## 2020-02-01 NOTE — PROGRESS NOTES
Reason for Admission:  Chest Pain                    RUR Score:  8                   Plan for utilizing home health:  No PT/OT needs at this time, Pt is not homebound                      Current Advanced Directive/Advance Care Plan:  Full Code - ACP not on file                          Transition of Care Plan:  Follow-up Care Appointments (PCP, Cardiology)    46 yo  male admitted on 1/30/20 for Chest Pain. Lives with spouse in [facility]. Pt has been out of work due to a work injury for some time, planning to see spinal surgeon specialist on 2/7/20 re: such injury. Reports I with ADLs/IADLs to include driving. No hx of HH, SNF, or acute inpatient rehab. Pt reported he sees providers a [employer] in [facility], but does not have a specific provider. Pt advised to follow-up with PCP & Cardiology at discharge, as offices are closed at this time and unable to schedule appointments. Pt verbalized understanding. No PT/OT consults or needs at this time, pt is not homebound. All information entered into pt AVS.     Pt has no additional CM needs at this time. Care Management Interventions  PCP Verified by CM: Yes([facility])  Palliative Care Criteria Met (RRAT>21 & CHF Dx)?: No  Mode of Transport at Discharge: Other (see comment)(by private vehicle with spouse)  Transition of Care Consult (CM Consult): Discharge Planning  Discharge Durable Medical Equipment: No  Health Maintenance Reviewed: Yes  Physical Therapy Consult: No  Occupational Therapy Consult: No  Speech Therapy Consult: No  Current Support Network: Lives with Spouse  Confirm Follow Up Transport: Family  The Plan for Transition of Care is Related to the Following Treatment Goals :  Follow-up Care Appointments  The Patient and/or Patient Representative was Provided with a Choice of Provider and Agrees with the Discharge Plan?: Yes  Discharge Location  Discharge Placement: Home with family assistance    Christine Willis, MSW [employer]isee in Social Work, [address] [place] [address]  [phone]

## 2020-02-01 NOTE — PROGRESS NOTES
[facility]      [address] [place] [address]  [phone]      Cardiology Progress Note      2/1/2020 9:09 AM    Admit Date: 1/30/2020    Admit Diagnosis:   Chest pain [R07.9]; Chest pain [R07.9]    Subjective:     Tiara Car     No CP    Visit Vitals  BP (!) 156/102   Pulse 79   Temp 97.5 °F (36.4 °C)   Resp 18   Ht 5' 3\" (1.6 m)   Wt 175 lb 0.7 oz (79.4 kg)   SpO2 97%   BMI 31.01 kg/m²       Current Facility-Administered Medications   Medication Dose Route Frequency    metoprolol tartrate (LOPRESSOR) tablet 50 mg  50 mg Oral Q12H    fentaNYL citrate (PF) injection 25 mcg  25 mcg IntraVENous Q4H PRN    [Held by provider] oxyCODONE IR (ROXICODONE) tablet 5 mg  5 mg Oral Q4H PRN    nicotine (NICODERM CQ) 14 mg/24 hr patch 1 Patch  1 Patch TransDERmal DAILY    thiamine mononitrate (B-1) tablet 100 mg  100 mg Oral DAILY    hydroCHLOROthiazide (HYDRODIURIL) tablet 25 mg  25 mg Oral DAILY    folic acid (FOLVITE) tablet 1 mg  1 mg Oral DAILY    nitroglycerin (NITROBID) 2 % ointment 1 Inch  1 Inch Topical BID PRN    aspirin chewable tablet 81 mg  81 mg Oral DAILY    albuterol-ipratropium (DUO-NEB) 2.5 MG-0.5 MG/3 ML  3 mL Nebulization Q6H PRN    0.9% sodium chloride infusion  100 mL/hr IntraVENous CONTINUOUS    atorvastatin (LIPITOR) tablet 20 mg  20 mg Oral QHS    ticagrelor (BRILINTA) tablet 90 mg  90 mg Oral Q12H    sodium chloride (NS) flush 5-40 mL  5-40 mL IntraVENous Q8H    sodium chloride (NS) flush 5-40 mL  5-40 mL IntraVENous PRN    naloxone (NARCAN) injection 0.4 mg  0.4 mg IntraVENous PRN    melatonin tablet 3 mg  3 mg Oral QHS PRN    sodium chloride (NS) flush 5-40 mL  5-40 mL IntraVENous Q8H    sodium chloride (NS) flush 5-40 mL  5-40 mL IntraVENous PRN    acetaminophen (TYLENOL) tablet 650 mg  650 mg Oral Q4H PRN    ondansetron (ZOFRAN) injection 4 mg  4 mg IntraVENous Q4H PRN    amLODIPine (NORVASC) tablet 10 mg  10 mg Oral DAILY Objective:      Physical Exam:  General Appearance:    Chest:   Clear  Cardiovascular: RRR  Extremities: no edema  Skin:  Warm and dry.     Data Review:   Recent Labs     01/30/20 2035   WBC 7.2   HGB 14.9   HCT 43.0        Recent Labs     02/01/20  0702 01/31/20  1117 01/30/20 2035    142 144   K 4.2 4.3 3.9    108 112*   CO2 26 29 22   * 92 158*   BUN 13 12 8   CREA 0.74 0.71 0.64*   CA 8.8 8.5 8.5   ALB  --   --  3.3*   TBILI  --   --  0.2   SGOT  --   --  26   ALT  --   --  35       Recent Labs     01/31/20  0402 01/30/20 2035   TROIQ <0.05 <0.05         Intake/Output Summary (Last 24 hours) at 2/1/2020 5094  Last data filed at 1/31/2020 2349  Gross per 24 hour   Intake 370 ml   Output 350 ml   Net 20 ml        Telemetry:   EKG:  Cxray:    Assessment:     Principal Problem:    CAD (coronary artery disease) (1/31/2020)    Active Problems:    Chest pain (1/30/2020)      S/P cardiac cath (1/31/2020)      Overview: 1/31/2020- JOSELINE/PCI to Diagonal       Hyperlipidemia (2/1/2020)      Chronic low back pain (2/1/2020)      Tobacco abuse (2/1/2020)      ETOH abuse (2/1/2020)        Plan:     Stable post PCI; home today    Kady Torre M.D., [facility]

## 2020-02-01 NOTE — DISCHARGE INSTRUCTIONS
Patient Discharge Instructions     Pt Name  Pama Prader   Date of Birth 1965   Age  47 y.o. Medical Record Number  [id number]   PCP Other, MD Colt    Admit date:  1/30/2020 @    [address] [place] [address]    Room Number  2156/01   Date of Discharge 2/1/2020     Admission Diagnoses:     CAD (coronary artery disease)        No Known Allergies     You were admitted to [address] [place] [address] for  CAD (coronary artery disease)    YOUR OTHER MEDICAL DIAGNOSES INCLUDE (BUT NOT LIMITED TO ):  Present on Admission:   Chest pain   CAD (coronary artery disease)   S/P cardiac cath   Hyperlipidemia   Chronic low back pain   Tobacco abuse   ETOH abuse      DIET:  Cardiac Diet       Recommended activity: Activity as tolerated  Follow up : Follow-up Information     Follow up With Specialties Details Why Contact Info    Rachel Arzola MD Cardiology, Vascular Surgery, General and Vascular Surgery In 1 week 1-2 weeks [address]  [phone]      [address]  In 1 week  [facility]  [phone]                       Cardiac Catheterization/Angiography Discharge Instructions    *Check the puncture site frequently for swelling or bleeding. If you see any bleeding, lie down and apply pressure over the area with a clean town or washcloth. Notify your doctor for any redness, swelling, drainage or oozing from the puncture site. Notify your doctor for any fever or chills. *If the leg or arm with the puncture becomes cold, numb or painful, call Dr. Deangelo Foley. *Activity should be limited for the next 48 hours. Climb stairs as little as possible and avoid any stooping, bending or strenuous activity for 48 hours. No heavy lifting (anything over 10 pounds) for three days. *Do not drive for 48 hours. *You may resume your usual diet.  Drink more fluids than usual.    *Have a responsible person drive you home and stay with you for at least 24 hours after your heart catheterization/angiography. *You may remove the bandage from your right wrist in 24 hours. You may shower in 24 hours. No tub baths, hot tubs or swimming for one week. Do not place any lotions, creams, powders, ointments over the puncture site for one week. You may place a clean band-aid over the puncture site each day for 5 days. Change this daily. · It is important that you take the medication exactly as they are prescribed. · Keep your medication in the bottles provided by the pharmacist and keep a list of the medication names, dosages, and times to be taken in your wallet. · Do not take other medications without consulting your doctor. ADDITIONAL INFORMATION: If you experience any of the following symptoms or have any health problem not listed below, then please call your primary care physician or return to the emergency room if you cannot get hold of your doctor: Fever, chills, nausea, vomiting, diarrhea, change in mentation, falling, bleeding, shortness of breath. I understand that if any problems occur once I am discharged, I am supposed to call my Primary care physician for further care or seek help in the Emergency Department at the nearest Healthcare facility. I have had an opportunity to discuss my clinical issues with my doctor and nursing staff. I understand and acknowledge receipt of the above instructions.                                                                                                                                            Physician's or R.N.'s Signature                                                            Date/Time                                                                                                                                              Patient or Representative Signature                                                 Date/Time

## 2020-02-01 NOTE — PROGRESS NOTES
Problem: Falls - Risk of  Goal: *Absence of Falls  Description  Document James Cardenas Fall Risk and appropriate interventions in the flowsheet.   Outcome: Progressing Towards Goal  Note: Fall Risk Interventions:            Medication Interventions: Evaluate medications/consider consulting pharmacy, Patient to call before getting OOB, Teach patient to arise slowly

## 2020-02-04 LAB — ACT BLD: 235 SECS (ref 79–138)

## 2020-02-17 ENCOUNTER — TELEPHONE (OUTPATIENT)
Dept: CARDIAC REHAB | Age: 55
End: 2020-02-17

## 2020-02-17 NOTE — TELEPHONE ENCOUNTER
Registered pt for [address] [address] on March 26,2020 at 1 pm. Orientation pack sent to confirmed address.

## 2020-02-19 ENCOUNTER — TELEPHONE (OUTPATIENT)
Dept: CARDIOLOGY CLINIC | Age: 55
End: 2020-02-19

## 2020-02-19 RX ORDER — CLOPIDOGREL BISULFATE 75 MG/1
TABLET ORAL
Qty: 90 TAB | Refills: 3 | Status: SHIPPED | OUTPATIENT
Start: 2020-02-20 | End: 2020-05-21

## 2020-02-19 NOTE — TELEPHONE ENCOUNTER
Called pharmacy and called in verbal order for Plavix 75 mg tab. 8 tabs by mouth on day 1 and 1 tab daily there after. Disp 90 tab and refill 3 advised to put on bottle to stop Brilinta on 2/21. She verbalized understanding and would get that ready for pt.

## 2020-02-19 NOTE — TELEPHONE ENCOUNTER
Message   Received: Today   Message Contents   Delphine Cueva, NP  Gisele Browne LPN   Caller: Unspecified (Today,  2:23 PM)             Symptoms probably side effects of Brilinta. I would switch him to Plavix. Take 600 mg Plavix tomorrow, 2/20.  Then starting 2/21, take 75 mg Plavix daily. Take Brilinta on 2/20 as well, but do not take Brilinta starting 2/21. Rx sent to pharmacy. Keep appt on 3/3. Thanks,   [place] pt,verified pt with two pt identifiers, advised pt our NP thinks his soboe is coming from his [address]. She is going to switch him from Brilinta to Plavix. Advised of multiple times to pt the following: loading dose of Plavix 600 mg on 2/20 with his Brilinta in am and Brilinta in pm. On 2/21 pt  Will take Plavix 75 mg daily and no more Brilinta. Verified pharmacy and I would get that sent in to the pharmacy. Advised pt to keep his appt on 3/3/20. Pt verbalized understanding and had no further questions.

## 2020-02-19 NOTE — TELEPHONE ENCOUNTER
Call from Elinor Aranda from [facility] called, she is  for pt. She spoke to pt today and pt is having some concerning symptoms after his discharge. She thinks it could be irregular heartbeat. Advised I would reach out to pt. Yina Landin verbalized understanding. Called pt,verified pt with two pt identifiers, advised pt I had received call from his  regarding irregular heartrate. Pt advised the symptoms have been going on for about 1 week. He is having a racing heart beat then a thump and it takes his breathe. He is soboe with occasionally chest tightness. He did buy a BP monitor and his BP has been 129/85-lf arm and 153/62-rt arm and his HR has been no higher than 97, mostly in 80's. He thinks his cuff is not correct and is more than likely going to take it back. Advised I would send to our NP for any further advice. Verified appt on 3/3/20. Advised pt he might need to be seen sooner. Advised if his symptoms persist with heart racing and not stopping, chest discomfort or trouble breathing then can go to the ER. Pt verbalized understanding.

## 2020-03-03 ENCOUNTER — OFFICE VISIT (OUTPATIENT)
Dept: CARDIOLOGY CLINIC | Age: 55
End: 2020-03-03

## 2020-03-03 VITALS
WEIGHT: 175 LBS | DIASTOLIC BLOOD PRESSURE: 80 MMHG | HEART RATE: 87 BPM | RESPIRATION RATE: 16 BRPM | BODY MASS INDEX: 31.01 KG/M2 | SYSTOLIC BLOOD PRESSURE: 140 MMHG | HEIGHT: 63 IN | OXYGEN SATURATION: 98 %

## 2020-03-03 DIAGNOSIS — F10.10 ETOH ABUSE: ICD-10-CM

## 2020-03-03 DIAGNOSIS — I10 ESSENTIAL HYPERTENSION: ICD-10-CM

## 2020-03-03 DIAGNOSIS — I25.10 CORONARY ARTERY DISEASE INVOLVING NATIVE CORONARY ARTERY OF NATIVE HEART WITHOUT ANGINA PECTORIS: Primary | ICD-10-CM

## 2020-03-03 DIAGNOSIS — Z72.0 TOBACCO ABUSE: ICD-10-CM

## 2020-03-03 DIAGNOSIS — E78.2 MIXED HYPERLIPIDEMIA: ICD-10-CM

## 2020-03-03 RX ORDER — ATORVASTATIN CALCIUM 20 MG/1
20 TABLET, FILM COATED ORAL
Qty: 90 TAB | Refills: 3 | Status: SHIPPED | OUTPATIENT
Start: 2020-03-03

## 2020-03-03 RX ORDER — METOPROLOL TARTRATE 50 MG/1
50 TABLET ORAL EVERY 12 HOURS
Qty: 180 TAB | Refills: 3 | Status: SHIPPED | OUTPATIENT
Start: 2020-03-03 | End: 2021-05-17

## 2020-03-03 RX ORDER — MIRTAZAPINE 30 MG/1
TABLET, FILM COATED ORAL
COMMUNITY
End: 2020-12-01

## 2020-03-03 NOTE — PROGRESS NOTES
Myrna Darden, [facility]    Subjective/HPI:     Inder Chowdhury is a 54 y.o. male is here for routine f/u. He has a PMHx of COPD, HTN, tobacco and EtOH abuse and chronic back pain. He was admitted to [address] [place] [address] from 1/30/2020 to 2/1/2020 for ACS. He had cardiac catheterization and had JOSELINE to D1. Echo done showed preserved ejection fraction, no significant valvular pathology. He was discharged home with nicotine patches to help with smoking cessation. He was previously a 2 ppd smoker. He requires extensive lumbar surgery for severe spinal stenosis. He did inform his surgeons at [facility] this will need to wait given recent stent placement, but they anticipate surgery to be completed around August 2020. PCP Provider  Other, MD Colt    Past Medical History:   Diagnosis Date    CAD (coronary artery disease) 1/31/2020    Chronic low back pain 2/1/2020    Chronic obstructive pulmonary disease ([facility] [address]     Hyperlipidemia 2/1/2020    Hypertension         No Known Allergies     Outpatient Encounter Medications as of 3/3/2020   Medication Sig Dispense Refill    mirtazapine (REMERON) 30 mg tablet Take  by mouth nightly.  clopidogreL (PLAVIX) 75 mg tab Take 8 Tabs by mouth daily for 1 day, THEN 1 Tab daily for 90 days. Indications: blood clot prevention following percutaneous coronary intervention 90 Tab 3    thiamine mononitrate (B-1) 100 mg tablet Take 1 Tab by mouth daily. 30 Tab 0    folic acid (FOLVITE) 1 mg tablet Take 1 Tab by mouth daily. 30 Tab 0    aspirin 81 mg chewable tablet Take 1 Tab by mouth daily. 30 Tab 0    atorvastatin (LIPITOR) 20 mg tablet Take 1 Tab by mouth nightly. 30 Tab 0    metoprolol tartrate (LOPRESSOR) 50 mg tablet Take 1 Tab by mouth every twelve (12) hours. 60 Tab 0    methocarbamol (ROBAXIN) 750 mg tablet Take 750 mg by mouth four (4) times daily.  amLODIPine (NORVASC) 10 mg tablet Take 10 mg by mouth daily.       hydroCHLOROthiazide (MICROZIDE) 12.5 mg capsule Take 25 mg by mouth daily.  acetaminophen (TYLENOL) 325 mg tablet Take 500 mg by mouth every four (4) hours as needed for Pain.  baclofen (LIORESAL) 10 mg tablet Take 10 mg by mouth three (3) times daily.  [] nicotine (NICODERM CQ) 14 mg/24 hr patch 1 Patch by TransDERmal route daily for 30 days. 30 Patch 0     No facility-administered encounter medications on file as of 3/3/2020. Review of Symptoms:    Review of Systems   Constitutional: Negative for chills, fever and weight loss. HENT: Negative for nosebleeds. Eyes: Negative for blurred vision and double vision. Respiratory: Negative for cough, shortness of breath and wheezing. Cardiovascular: Negative for chest pain, palpitations, orthopnea, leg swelling and PND. Gastrointestinal: Negative for abdominal pain, blood in stool, diarrhea, nausea and vomiting. Musculoskeletal: Positive for back pain. Negative for joint pain. Skin: Negative for rash. Neurological: Negative for dizziness, tingling and loss of consciousness. Endo/Heme/Allergies: Does not bruise/bleed easily. Physical Exam:      General: Well developed, in no acute distress, cooperative and alert  HEENT: No carotid bruits, no JVD, trach is midline. Neck Supple, PEERL, EOM intact. Heart:  reg rate and rhythm; normal S1/S2; no murmurs, no gallops or rubs. Respiratory: Clear bilaterally x 4, no wheezing or rales  Abdomen:   Soft, non-tender, no distention, no masses. + BS. Extremities:  Normal cap refill, no cyanosis, atraumatic. No edema. Neuro: A&Ox3, speech clear, gait stable. Skin: Skin color is normal. No rashes or lesions.  Non diaphoretic  Vascular: 2+ pulses symmetric in all extremities    Visit Vitals  /80 (BP 1 Location: Right arm, BP Patient Position: Sitting)   Pulse 87   Resp 16   Ht 5' 3\" (1.6 m)   Wt 175 lb (79.4 kg)   SpO2 98%   BMI 31.00 kg/m²       ECG: sinus rhythm    Cardiology Labs:    Lab Results   Component Value Date/Time    Cholesterol, total 252 (H) 01/30/2020 08:35 PM    HDL Cholesterol 61 01/30/2020 08:35 PM    LDL, calculated 158.8 (H) 01/30/2020 08:35 PM    VLDL, calculated 32.2 01/30/2020 08:35 PM    CHOL/HDL Ratio 4.1 01/30/2020 08:35 PM       Lab Results   Component Value Date/Time    Hemoglobin A1c 5.6 01/30/2020 08:35 PM       Lab Results   Component Value Date/Time    Sodium 139 02/01/2020 07:02 AM    Potassium 4.2 02/01/2020 07:02 AM    Chloride 108 02/01/2020 07:02 AM    CO2 26 02/01/2020 07:02 AM    Glucose 104 (H) 02/01/2020 07:02 AM    BUN 13 02/01/2020 07:02 AM    Creatinine 0.74 02/01/2020 07:02 AM    BUN/Creatinine ratio 18 02/01/2020 07:02 AM    GFR est AA >60 02/01/2020 07:02 AM    GFR est non-AA >60 02/01/2020 07:02 AM    Calcium 8.8 02/01/2020 07:02 AM    Anion gap 5 02/01/2020 07:02 AM    Bilirubin, total 0.2 01/30/2020 08:35 PM    ALT (SGPT) 35 01/30/2020 08:35 PM    AST (SGOT) 26 01/30/2020 08:35 PM    Alk. phosphatase 62 01/30/2020 08:35 PM    Protein, total 6.3 (L) 01/30/2020 08:35 PM    Albumin 3.3 (L) 01/30/2020 08:35 PM    Globulin 3.0 01/30/2020 08:35 PM    A-G Ratio 1.1 01/30/2020 08:35 PM          Assessment:       ICD-10-CM ICD-9-CM    1. Coronary artery disease involving native coronary artery of native heart without angina pectoris I25.10 414.01    2. Essential hypertension I10 401.9    3. Mixed hyperlipidemia E78.2 272.2    4. Tobacco abuse Z72.0 305.1    5. ETOH abuse F10.10 305.00         Plan:     1. Coronary artery disease involving native coronary artery of native heart without angina pectoris  S/p JOSELINE to D1 in 1/2020 in the setting of ACS  Echo done 1/2020 with preserved ejection fraction 60-65%, no significant valvular pathology  Developed JORGE LUIS to Brilinta, now switched to Plavix and feeling better  Continue ASA, statin, BB, CCB. Advised he cannot interrupt DAPT for at least 1 year from stent placement.   Spinal surgery will need to be postponed until 1/2021.    2. Essential hypertension  BP controlled. Continue anti-hypertensive therapy and low sodium diet    3. Mixed hyperlipidemia   in 1/2020  Continue atorvastatin 20 mg daily  Has made good changes to diet    4. Tobacco abuse  Down to 10 cigarettes per day, previously 2 ppd  Is waiting for his Medicare plan to kick in before he buys nicotine patches    5. ETOH abuse  Down to 5 beers a day, previously 1 pack per day. Discussed importance of abstinence    F/u with Dr. Talita Bueno in 3 months    Eather Lesches, NP     Patient seen and examined by me with nurse practitioner. I personally performed all components of the history, physical, and medical decision making and agree with the assessment and plan as noted.     Re Wallace MD

## 2020-03-11 ENCOUNTER — DOCUMENTATION ONLY (OUTPATIENT)
Dept: CARDIOLOGY CLINIC | Age: 55
End: 2020-03-11

## 2020-03-26 ENCOUNTER — HOSPITAL ENCOUNTER (OUTPATIENT)
Dept: CARDIAC REHAB | Age: 55
End: 2020-03-26
Attending: INTERNAL MEDICINE

## 2020-05-28 ENCOUNTER — HOSPITAL ENCOUNTER (OUTPATIENT)
Dept: CARDIAC REHAB | Age: 55
Discharge: HOME | End: 2020-05-28
Payer: MEDICARE

## 2020-05-28 DIAGNOSIS — Z95.5 STENTED CORONARY ARTERY: ICD-10-CM

## 2020-05-28 PROCEDURE — 93797 PHYS/QHP OP CAR RHAB WO ECG: CPT

## 2020-05-28 PROCEDURE — 93798 PHYS/QHP OP CAR RHAB W/ECG: CPT

## 2020-05-28 NOTE — CARDIO/PULMONARY
Shine Peters   54 y.o.  presented to cardiac rehab for orientation and exercise tolerance test today with a primary diagnosis of cardiac stent on 1/31/20. Shine Peters has a history of HTN, CAD, Hyperlipidemia, and back pain. Cardiac risk factors include smoking/ tobacco exposure, family history, dyslipidemia, hypertension, stress. Shine Peters is  and lives with.his wife. He attends [employer] and is active with the local food pantry. He works at the [employer]. PHQ9, depression score, is 16 and this is considered moderate. The result was discussed with patient who affirms score to be accurate and results were faxed to Dr. Stanford Adhikari. Patient denied chest pain or SOB during 6 minute walk test and was in NS with rare PVC's. Exercise prescription developed around patient stated goals, to be supplemented with home exercise recommendations. EF is 65%. Education manual given to patient and reviewed.  Patient will attend cardiac rehab 2-3 times/week and education

## 2020-05-29 VITALS — BODY MASS INDEX: 32.39 KG/M2 | HEIGHT: 63 IN | WEIGHT: 182.8 LBS

## 2020-06-01 ENCOUNTER — TELEPHONE (OUTPATIENT)
Dept: CARDIOLOGY CLINIC | Age: 55
End: 2020-06-01

## 2020-06-01 NOTE — TELEPHONE ENCOUNTER
Please contact pt to ask if he has an appt coming up with his PCP. We received notification from cardiac rehab that he has scored moderately severe on the depression screening. If he has no suicidal ideations he should see his PCP to discuss, or we can refer to behavioral health for consultation.

## 2020-06-01 NOTE — TELEPHONE ENCOUNTER
Verified patient with 2 identifiers  Patient goes to [facility] for primary care. He has been trying to schedule an appt but has not been able to get through. Denies suicidal thoughts. Patient states he will try again today to get in to his pcp. Asked him to inform us when he gets an appt. Patient stated he will.

## 2020-06-08 ENCOUNTER — TELEPHONE (OUTPATIENT)
Dept: CARDIAC REHAB | Age: 55
End: 2020-06-08

## 2020-06-08 NOTE — TELEPHONE ENCOUNTER
Cardiopulmonary Rehab Nursing Entry:    Pt called to check on status of scheduling additional appointments. Left message on voicemail.

## 2020-08-24 ENCOUNTER — OFFICE VISIT (OUTPATIENT)
Dept: CARDIOLOGY CLINIC | Age: 55
End: 2020-08-24
Payer: MEDICARE

## 2020-08-24 VITALS
HEIGHT: 63 IN | HEART RATE: 60 BPM | WEIGHT: 179 LBS | BODY MASS INDEX: 31.71 KG/M2 | DIASTOLIC BLOOD PRESSURE: 90 MMHG | OXYGEN SATURATION: 98 % | RESPIRATION RATE: 18 BRPM | SYSTOLIC BLOOD PRESSURE: 140 MMHG

## 2020-08-24 DIAGNOSIS — Z72.0 TOBACCO ABUSE: ICD-10-CM

## 2020-08-24 DIAGNOSIS — F10.10 ETOH ABUSE: ICD-10-CM

## 2020-08-24 DIAGNOSIS — I25.10 CORONARY ARTERY DISEASE INVOLVING NATIVE CORONARY ARTERY OF NATIVE HEART WITHOUT ANGINA PECTORIS: Primary | ICD-10-CM

## 2020-08-24 DIAGNOSIS — E78.2 MIXED HYPERLIPIDEMIA: ICD-10-CM

## 2020-08-24 DIAGNOSIS — I10 ESSENTIAL HYPERTENSION: ICD-10-CM

## 2020-08-24 PROCEDURE — 99213 OFFICE O/P EST LOW 20 MIN: CPT | Performed by: NURSE PRACTITIONER

## 2020-08-24 PROCEDURE — G8427 DOCREV CUR MEDS BY ELIG CLIN: HCPCS | Performed by: NURSE PRACTITIONER

## 2020-08-24 PROCEDURE — G8753 SYS BP > OR = 140: HCPCS | Performed by: NURSE PRACTITIONER

## 2020-08-24 PROCEDURE — G8432 DEP SCR NOT DOC, RNG: HCPCS | Performed by: NURSE PRACTITIONER

## 2020-08-24 PROCEDURE — 3017F COLORECTAL CA SCREEN DOC REV: CPT | Performed by: NURSE PRACTITIONER

## 2020-08-24 PROCEDURE — G8755 DIAS BP > OR = 90: HCPCS | Performed by: NURSE PRACTITIONER

## 2020-08-24 PROCEDURE — G8417 CALC BMI ABV UP PARAM F/U: HCPCS | Performed by: NURSE PRACTITIONER

## 2020-08-24 PROCEDURE — 93000 ELECTROCARDIOGRAM COMPLETE: CPT | Performed by: NURSE PRACTITIONER

## 2020-08-24 RX ORDER — CLOPIDOGREL BISULFATE 75 MG/1
TABLET ORAL
COMMUNITY
Start: 2020-07-09 | End: 2020-12-01

## 2020-08-24 NOTE — LETTER
8/24/20 Patient: Shalom Gunderson YOB: 1965 Date of Visit: 8/24/2020 Supriya Burton MD 
[address] [place] [address] VIA Facsimile: [phone] Dear Supriya Burton MD, Thank you for referring Mr. Kristina Green to [facility] for evaluation. My notes for this consultation are attached. If you have questions, please do not hesitate to call me. I look forward to following your patient along with you. Sincerely, Karyn Liang NP

## 2020-08-24 NOTE — PROGRESS NOTES
Chief Complaint   Patient presents with    Pre-op Exam     Back surgery remove and replace hardware and \" fix two buldging discs\" - lumbar spine - not scheduled yet - [facility] - ? Surgeon - denies cardiac sx      1. Have you been to the ER, urgent care clinic since your last visit? Hospitalized since your last visit? Yes [facility] in [place] for ankle about a month ago     2. Have you seen or consulted any other health care providers outside of the [address] [place] [address] since your last visit? Include any pap smears or colon screening.   Yes see above

## 2020-08-24 NOTE — PROGRESS NOTES
Subjective/HPI:     Mitchel Goodell is a 54 y.o. male is here for a f/u appt. He has a PMHx of COPD, HTN, tobacco and ETOH abuse and chronic back pain. Hx of admission 1/30/2020 to 2/1/2020 for ACS. He had cardiac catheterization and had JOSELINE to D1. Echo showed preserved ejection fraction, no significant valvular pathology. He was discharged home with nicotine patches to help with smoking cessation. He was previously a 2 ppd smoker. He didn't get the patches, he has been weaning, less than 1ppd now. He requires extensive lumbar surgery for severe spinal stenosis. He is requesting clearance for this surgery. PCP Provider  Other, MD Colt    Past Medical History:   Diagnosis Date    CAD (coronary artery disease) 1/31/2020    Chronic low back pain 2/1/2020    Chronic obstructive pulmonary disease ([address]     Chronic pain     lower back, left leg    Hyperlipidemia 2/1/2020    Hypertension         No Known Allergies     Outpatient Encounter Medications as of 8/24/2020   Medication Sig Dispense Refill    clopidogreL (PLAVIX) 75 mg tab 0 Refills      mirtazapine (REMERON) 30 mg tablet Take  by mouth nightly.  atorvastatin (LIPITOR) 20 mg tablet Take 1 Tab by mouth nightly. 90 Tab 3    metoprolol tartrate (LOPRESSOR) 50 mg tablet Take 1 Tab by mouth every twelve (12) hours. 180 Tab 3    aspirin 81 mg chewable tablet Take 1 Tab by mouth daily. 30 Tab 0    methocarbamol (ROBAXIN) 750 mg tablet Take 750 mg by mouth four (4) times daily.  amLODIPine (NORVASC) 10 mg tablet Take 10 mg by mouth daily.  hydroCHLOROthiazide (MICROZIDE) 12.5 mg capsule Take 25 mg by mouth daily.  acetaminophen (TYLENOL) 325 mg tablet Take 500 mg by mouth every four (4) hours as needed for Pain.  baclofen (LIORESAL) 10 mg tablet Take 10 mg by mouth three (3) times daily.  thiamine mononitrate (B-1) 100 mg tablet Take 1 Tab by mouth daily.  30 Tab 0    folic acid (FOLVITE) 1 mg tablet Take 1 Tab by mouth daily. 30 Tab 0     No facility-administered encounter medications on file as of 8/24/2020. Review of Symptoms:    Review of Systems   Constitutional: Negative for chills, fever and weight loss. HENT: Negative for nosebleeds. Eyes: Negative for blurred vision and double vision. Respiratory: Negative for cough, shortness of breath and wheezing. Cardiovascular: Negative for chest pain, palpitations, orthopnea, leg swelling and PND. Gastrointestinal: Negative for abdominal pain, blood in stool, diarrhea, nausea and vomiting. Musculoskeletal: Positive for back pain. Negative for joint pain. Skin: Negative for rash. Neurological: Negative for dizziness, tingling and loss of consciousness. Endo/Heme/Allergies: Does not bruise/bleed easily. Physical Exam:      General: Well developed, in no acute distress, cooperative and alert  HEENT: No carotid bruits, no JVD, trach is midline. Neck Supple, PEERL, EOM intact. Heart:  reg rate and rhythm; normal S1/S2; no murmurs, no gallops or rubs. Respiratory: Clear bilaterally x 4, no wheezing or rales  Abdomen:   Soft, non-tender, no distention, no masses. + BS. Extremities:  Normal cap refill, no cyanosis, atraumatic. No edema. Neuro: A&Ox3, speech clear, gait stable. Skin: Skin color is normal. No rashes or lesions.  Non diaphoretic  Vascular: 2+ pulses symmetric in all extremities    Visit Vitals  /90 (BP 1 Location: Right arm, BP Patient Position: Sitting)   Pulse 60   Resp 18   Ht 5' 3\" (1.6 m)   Wt 179 lb (81.2 kg)   SpO2 98%   BMI 31.71 kg/m²       ECG: sinus rhythm    Cardiology Labs:    Lab Results   Component Value Date/Time    Cholesterol, total 252 (H) 01/30/2020 08:35 PM    HDL Cholesterol 61 01/30/2020 08:35 PM    LDL, calculated 158.8 (H) 01/30/2020 08:35 PM    VLDL, calculated 32.2 01/30/2020 08:35 PM    CHOL/HDL Ratio 4.1 01/30/2020 08:35 PM       Lab Results   Component Value Date/Time    Hemoglobin A1c 5.6 01/30/2020 08:35 PM       Lab Results   Component Value Date/Time    Sodium 139 02/01/2020 07:02 AM    Potassium 4.2 02/01/2020 07:02 AM    Chloride 108 02/01/2020 07:02 AM    CO2 26 02/01/2020 07:02 AM    Glucose 104 (H) 02/01/2020 07:02 AM    BUN 13 02/01/2020 07:02 AM    Creatinine 0.74 02/01/2020 07:02 AM    BUN/Creatinine ratio 18 02/01/2020 07:02 AM    GFR est AA >60 02/01/2020 07:02 AM    GFR est non-AA >60 02/01/2020 07:02 AM    Calcium 8.8 02/01/2020 07:02 AM    Anion gap 5 02/01/2020 07:02 AM    Bilirubin, total 0.2 01/30/2020 08:35 PM    ALT (SGPT) 35 01/30/2020 08:35 PM    Alk. phosphatase 62 01/30/2020 08:35 PM    Protein, total 6.3 (L) 01/30/2020 08:35 PM    Albumin 3.3 (L) 01/30/2020 08:35 PM    Globulin 3.0 01/30/2020 08:35 PM    A-G Ratio 1.1 01/30/2020 08:35 PM          Assessment:       ICD-10-CM ICD-9-CM    1. Coronary artery disease involving native coronary artery of native heart without angina pectoris  I25.10 414.01 AMB POC EKG ROUTINE W/ 12 LEADS, INTER & REP   2. Essential hypertension  I10 401.9    3. Mixed hyperlipidemia  E78.2 272.2    4. Tobacco abuse  Z72.0 305.1    5. ETOH abuse  F10.10 305.00         Plan:     1. Coronary artery disease involving native coronary artery of native heart without angina pectoris  S/p JOSELINE to D1 in 1/2020 in the setting of ACS  Echo done 1/2020 with preserved ejection fraction 60-65%, no significant valvular pathology  He denies angina or anginal equivalent symptoms. EKG SR. Continue ASA, statin, BB, CCB, and Plavix. Advised he cannot interrupt DAPT for at least 1 year from stent placement with risk of coronary thrombus. Spinal surgery will need to be postponed until 1/2021.    2. Essential hypertension  BP mildly elevated. Normotensive at home. Continue anti-hypertensive therapy and low sodium diet    3. Mixed hyperlipidemia   in 1/2020  Continue atorvastatin 20 mg daily  Labs managed by PCP    4.  Tobacco abuse  Continues to smoke about 10 cigarettes per day, previously 2 ppd  Discussed importance of smoking cessation, he doesn't wish to get the nicotine patches at this time    5. ETOH abuse  Avg 4-5 beers a day  Discussed importance of abstinence    F/u with Dr. Jennifer Collier in December, can discuss clearance at that time for surgery.     Usha Olivier NP

## 2020-12-01 ENCOUNTER — OFFICE VISIT (OUTPATIENT)
Dept: CARDIOLOGY CLINIC | Age: 55
End: 2020-12-01
Payer: MEDICARE

## 2020-12-01 VITALS
SYSTOLIC BLOOD PRESSURE: 138 MMHG | RESPIRATION RATE: 18 BRPM | HEART RATE: 74 BPM | DIASTOLIC BLOOD PRESSURE: 82 MMHG | WEIGHT: 181 LBS | OXYGEN SATURATION: 96 % | BODY MASS INDEX: 32.07 KG/M2 | HEIGHT: 63 IN

## 2020-12-01 DIAGNOSIS — F10.10 ETOH ABUSE: ICD-10-CM

## 2020-12-01 DIAGNOSIS — I25.10 CORONARY ARTERY DISEASE INVOLVING NATIVE CORONARY ARTERY OF NATIVE HEART WITHOUT ANGINA PECTORIS: ICD-10-CM

## 2020-12-01 DIAGNOSIS — I25.10 ASHD (ARTERIOSCLEROTIC HEART DISEASE): ICD-10-CM

## 2020-12-01 DIAGNOSIS — E78.2 MIXED HYPERLIPIDEMIA: ICD-10-CM

## 2020-12-01 DIAGNOSIS — Z72.0 TOBACCO ABUSE: ICD-10-CM

## 2020-12-01 DIAGNOSIS — Z01.818 PREOPERATIVE CLEARANCE: Primary | ICD-10-CM

## 2020-12-01 DIAGNOSIS — I10 ESSENTIAL HYPERTENSION: ICD-10-CM

## 2020-12-01 PROCEDURE — 3017F COLORECTAL CA SCREEN DOC REV: CPT | Performed by: INTERNAL MEDICINE

## 2020-12-01 PROCEDURE — G8432 DEP SCR NOT DOC, RNG: HCPCS | Performed by: INTERNAL MEDICINE

## 2020-12-01 PROCEDURE — G8752 SYS BP LESS 140: HCPCS | Performed by: INTERNAL MEDICINE

## 2020-12-01 PROCEDURE — G8417 CALC BMI ABV UP PARAM F/U: HCPCS | Performed by: INTERNAL MEDICINE

## 2020-12-01 PROCEDURE — G8427 DOCREV CUR MEDS BY ELIG CLIN: HCPCS | Performed by: INTERNAL MEDICINE

## 2020-12-01 PROCEDURE — G8754 DIAS BP LESS 90: HCPCS | Performed by: INTERNAL MEDICINE

## 2020-12-01 PROCEDURE — 99214 OFFICE O/P EST MOD 30 MIN: CPT | Performed by: INTERNAL MEDICINE

## 2020-12-01 PROCEDURE — 93000 ELECTROCARDIOGRAM COMPLETE: CPT | Performed by: INTERNAL MEDICINE

## 2020-12-01 NOTE — PROGRESS NOTES
[address] [place] [address], [address] [facility]  [phone]     Subjective:      Deric Bradley is a 54 y.o. male is here for routine f/u and seeking cardiac clearance for pending back surgery to be done  At [facility]---has not been scheduled until cardiac clearance is obtained. He has a PMHx of COPD, HTN, tobacco and ETOH abuse and chronic back pain. Hx of admission 1/30/2020 to 2/1/2020 for ACS. He had cardiac catheterization and had JOSELINE to D1. Echo showed preserved ejection fraction, no significant valvular pathology    Last seen by us un 8/2020. He continues to smoke and drink alcohol but trying to quit. He has been exercising, walking on the treadmill 4x a week  2-3 miles each time. The patient denies chest pain/ shortness of breath, orthopnea, PND, LE edema, palpitations, syncope, or presyncope.        Patient Active Problem List    Diagnosis Date Noted    Hypertension 03/03/2020    Hyperlipidemia 02/01/2020    Chronic low back pain 02/01/2020    Tobacco abuse 02/01/2020    ETOH abuse 02/01/2020    CAD (coronary artery disease) 01/31/2020    S/P cardiac cath 01/31/2020    Chest pain 01/30/2020      Other, MD Colt  Past Medical History:   Diagnosis Date    CAD (coronary artery disease) 1/31/2020    Chronic low back pain 2/1/2020    Chronic obstructive pulmonary disease (HCC)     Chronic pain     lower back, left leg    Hyperlipidemia 2/1/2020    Hypertension       Past Surgical History:   Procedure Laterality Date    HX HERNIA REPAIR  2004    HX ORTHOPAEDIC      carpal tunnel bilateral, lumbar unknown to pt which ones    HX ORTHOPAEDIC      neck, back     No Known Allergies   Family History   Problem Relation Age of Onset    Heart Disease Mother       Social History     Socioeconomic History    Marital status:      Spouse name: Not on file    Number of children: 2    Years of education: 15    Highest education level: High school graduate   Occupational History    Not on file   Social Needs    Financial resource strain: Not hard at all   [facility]     Worry: Patient refused     Inability: Patient refused    Transportation needs     Medical: Patient refused     Non-medical: Patient refused   Tobacco Use    Smoking status: Current Every Day Smoker     Packs/day: 1.00     Years: 43.00     Pack years: 43.00    Smokeless tobacco: Never Used    Tobacco comment: has tried adjunct therapies in past   Substance and Sexual Activity    Alcohol use: Yes     Alcohol/week: 4.0 - 6.0 standard drinks     Types: 4 - 6 Cans of beer per week     Comment: daily drinker    Drug use: Not on file    Sexual activity: Not Currently     Partners: Female   Lifestyle    Physical activity     Days per week: 0 days     Minutes per session: 0 min    Stress: Very much   Relationships    Social connections     Talks on phone: Three times a week     Gets together: Three times a week     Attends Hindu service: More than 4 times per year     Active member of club or organization: Yes     Attends meetings of clubs or organizations: More than 4 times per year     Relationship status:     Intimate partner violence     Fear of current or ex partner: No     Emotionally abused: No     Physically abused: No     Forced sexual activity: No   Other Topics Concern     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  metoprolol tartrate (LOPRESSOR) 50 mg tablet Take 1 Tab by mouth every twelve (12) hours.     aspirin 81 mg chewable tablet Take 1 Tab by mouth daily.  methocarbamol (ROBAXIN) 750 mg tablet Take 750 mg by mouth four (4) times daily.  amLODIPine (NORVASC) 10 mg tablet Take 10 mg by mouth daily.  hydroCHLOROthiazide (MICROZIDE) 12.5 mg capsule Take 25 mg by mouth daily.  acetaminophen (TYLENOL) 325 mg tablet Take 500 mg by mouth every four (4) hours as needed for Pain.  baclofen (LIORESAL) 10 mg tablet Take 10 mg by mouth three (3) times daily. No current facility-administered medications for this visit. Review of Symptoms:  11 systems reviewed, negative other than as stated in the HPI    Physical ExamPhysical Exam:    Vitals:    12/01/20 1120   BP: 138/82   Pulse: 74   Resp: 18   SpO2: 96%   Weight: 181 lb (82.1 kg)   Height: 5' 3\" (1.6 m)     Body mass index is 32.06 kg/m². General PE  Gen:  NAD  Mental Status - Alert. General Appearance - Not in acute distress. HEENT:  PERRL, no carotid bruits or JVD  Chest and Lung Exam   Inspection: Accessory muscles - No use of accessory muscles in breathing. Auscultation:   Breath sounds: - Normal.   Cardiovascular   Inspection: Jugular vein - Bilateral - Inspection Normal.   Palpation/Percussion:   Apical Impulse: - Normal.   Auscultation: Rhythm - Regular. Heart Sounds - S1 WNL and S2 WNL. No S3 or S4. Murmurs & Other Heart Sounds: Auscultation of the heart reveals - No Murmurs. Peripheral Vascular   Upper Extremity: Inspection - Bilateral - No Cyanotic nailbeds or Digital clubbing. Lower Extremity:   Palpation: Edema - Bilateral - No edema. Abdomen:   Soft, non-tender, bowel sounds are active.   Neuro: A&O times 3, CN and motor grossly WNL    Labs:   Lab Results   Component Value Date/Time    Cholesterol, total 252 (H) 01/30/2020 08:35 PM    HDL Cholesterol 61 01/30/2020 08:35 PM    LDL, calculated 158.8 (H) 01/30/2020 08:35 PM    Triglyceride 161 (H) 01/30/2020 08:35 PM    CHOL/HDL Ratio 4.1 01/30/2020 08:35 PM     No results found for: CPK, CPKX, CPX  Lab Results   Component Value Date/Time    Sodium 139 02/01/2020 07:02 AM    Potassium 4.2 02/01/2020 07:02 AM    Chloride 108 02/01/2020 07:02 AM    CO2 26 02/01/2020 07:02 AM    Anion gap 5 02/01/2020 07:02 AM    Glucose 104 (H) 02/01/2020 07:02 AM    BUN 13 02/01/2020 07:02 AM    Creatinine 0.74 02/01/2020 07:02 AM    BUN/Creatinine ratio 18 02/01/2020 07:02 AM    GFR est AA >60 02/01/2020 07:02 AM    GFR est non-AA >60 02/01/2020 07:02 AM    Calcium 8.8 02/01/2020 07:02 AM    Bilirubin, total 0.2 01/30/2020 08:35 PM    Alk. phosphatase 62 01/30/2020 08:35 PM    Protein, total 6.3 (L) 01/30/2020 08:35 PM    Albumin 3.3 (L) 01/30/2020 08:35 PM    Globulin 3.0 01/30/2020 08:35 PM    A-G Ratio 1.1 01/30/2020 08:35 PM    ALT (SGPT) 35 01/30/2020 08:35 PM       EKG:       Assessment:     Assessment:      1. Preoperative clearance    2. Coronary artery disease involving native coronary artery of native heart without angina pectoris    3. Essential hypertension    4. Mixed hyperlipidemia    5. Tobacco abuse    6. ETOH abuse    7. ASHD (arteriosclerotic heart disease)        Orders Placed This Encounter    CK    METABOLIC PANEL, COMPREHENSIVE    LIPID PANEL    AMB POC EKG ROUTINE W/ 12 LEADS, INTER & REP     Order Specific Question:   Reason for Exam:     Answer:   routine        Plan:     1. Coronary artery disease involving native coronary artery of native heart without angina pectoris  S/p JOSELINE to D1 in 1/2020 in the setting of [place]  Echo done 1/2020 with preserved ejection fraction 60-65%, no significant valvular pathology  Continue ASA, BB, CCB, statin  He will continue Plavix until completed--finishing remaining, surgery is planned for january     2. Essential hypertension  Controlled with current therapy  Continue anti-hypertensive therapy and low sodium diet     3. Mixed hyperlipidemia   in 1/2020  Continue atorvastatin 20 mg daily  Will check labs now     4.  Tobacco abuse, Down to about 10 cigarettes per day, previously 2 ppd  He doesn't wish to get the nicotine patches at this time     5. ETOH abuse  Down to 3-4 beers a day  Discussed importance of abstinence    6. Preopclearance:   Low cardiac risk to proceed with spine surgery. May hold ASA 5-7 days prior to surgery, longer if needed as deemed by neurosurgeon. Resume thereafter. Continue current care and f/u in 6 mos    Silvana Valenzuela NP       Patient seen and examined by me with nurse practitioner. I personally performed all components of the history, physical, and medical decision making and agree with the assessment and plan as noted. Can proceed with non cardiac surgery. No further work up is needed. Continue DAPT this month since surgery in not till Jan. Can hold meds for his back surgery and after procedure, resume aspirin monotherapy once cleared by surgery.      Sagar Jones MD

## 2020-12-17 ENCOUNTER — TELEPHONE (OUTPATIENT)
Dept: CARDIOLOGY CLINIC | Age: 55
End: 2020-12-17

## 2020-12-17 DIAGNOSIS — I25.10 CORONARY ARTERY DISEASE INVOLVING NATIVE CORONARY ARTERY OF NATIVE HEART WITHOUT ANGINA PECTORIS: Primary | ICD-10-CM

## 2020-12-17 DIAGNOSIS — R74.8 ELEVATED CK: ICD-10-CM

## 2020-12-17 NOTE — TELEPHONE ENCOUNTER
Please let pt know that his cholesterol is still not at goal. . His enzyme for muscle damage is elevated, how much alcohol is he drinking? This can cause his numbers to go up. His liver enzymes are essentially normal. We should hold his statin for a couple weeks and repeat a CK to see if it improves off the statin. Will decide next steps after that.

## 2020-12-17 NOTE — TELEPHONE ENCOUNTER
Verified patient with 2 identifiers   Patient states he does drink 4-5 beers nightly. Advised of Rachael Finch NP recommendations regarding chol and CK. Also advised to reduce beer intake to at least 1-2 nightly. Lab slip sent to patient to retest 2 weeks after stopping atorvastatin.

## 2020-12-17 NOTE — TELEPHONE ENCOUNTER
Pasting previous note here so it will run in order with patient note. Please let pt know that his cholesterol is still not at goal. . His enzyme for muscle damage is elevated, how much alcohol is he drinking? This can cause his numbers to go up. His liver enzymes are essentially normal. We should hold his statin for a couple weeks and repeat a CK to see if it improves off the statin.  Will decide next steps after that

## 2020-12-31 PROBLEM — Z01.818 PREOPERATIVE CLEARANCE: Status: RESOLVED | Noted: 2020-12-01 | Resolved: 2020-12-31

## 2021-04-12 DIAGNOSIS — I25.10 CORONARY ARTERY DISEASE INVOLVING NATIVE CORONARY ARTERY OF NATIVE HEART WITHOUT ANGINA PECTORIS: ICD-10-CM

## 2021-04-12 RX ORDER — ATORVASTATIN CALCIUM 20 MG/1
TABLET, FILM COATED ORAL
Qty: 90 TAB | Refills: 3 | OUTPATIENT
Start: 2021-04-12

## 2021-04-14 NOTE — TELEPHONE ENCOUNTER
Gabby Bunch, KYLER  You 2 days ago     He needs lipids checked; ordered in 12/2020, did not get these done    Routing comment          Called pt,verified pt with two pt identifiers, advised pt we received the request for Lipitor refill but our NP is advising he needs the labs ordered at this last visit. Pt advised his PCP did that lab work and they advised no sense in going back to cardiology because it would be double the work. Advised he will need to call his PCP and have them refill his Lipitor since they are doing his labs. Pt advised he will call his PCP for the refill. Pt verbalized understanding.

## 2022-01-01 NOTE — ED NOTES
TRANSFER - OUT REPORT:    Verbal report given to Junior Mendez RN (name) on Pama Prader  being transferred to Oncology (unit) for routine progression of care       Report consisted of patients Situation, Background, Assessment and   Recommendations(SBAR). Information from the following report(s) SBAR, Kardex, ED Summary, Procedure Summary, Intake/Output, MAR, Recent Results and Cardiac Rhythm NSR with EKG changes was reviewed with the receiving nurse. Lines:   Peripheral IV 01/30/20 Right Antecubital (Active)   Site Assessment Clean, dry, & intact 1/30/2020 11:28 PM   Phlebitis Assessment 0 1/30/2020 11:28 PM   Infiltration Assessment 0 1/30/2020 11:28 PM   Dressing Status Clean, dry, & intact 1/30/2020 11:28 PM   Hub Color/Line Status Green 1/30/2020 11:28 PM        Opportunity for questions and clarification was provided.       Patient transported with:   [employer] I will START or STAY ON the medications listed below when I get home from the hospital:  None

## (undated) DEVICE — HI-TORQUE VERSACORE MODIFIED J GUIDE WIRE SYSTEM 260 CM: Brand: HI-TORQUE VERSACORE

## (undated) DEVICE — RUNTHROUGH NS EXTRA FLOPPY PTCA GUIDEWIRE: Brand: RUNTHROUGH

## (undated) DEVICE — HI-TORQUE VERSACORE FLOPPY GUIDE WIRE SYSTEM 145 CM: Brand: HI-TORQUE VERSACORE

## (undated) DEVICE — 3M™ TEGADERM™ TRANSPARENT FILM DRESSING FRAME STYLE, 1626W, 4 IN X 4-3/4 IN (10 CM X 12 CM), 50/CT 4CT/CASE: Brand: 3M™ TEGADERM™

## (undated) DEVICE — TREK CORONARY DILATATION CATHETER 2.50 MM X 12 MM / RAPID-EXCHANGE: Brand: TREK

## (undated) DEVICE — CATHETER ETER ANGIO L110CM OD5FR ID046IN L75CM 038IN 145DEG CARD

## (undated) DEVICE — CATH GUID .056IN 6FR 150CM -- GUIDELINER V3

## (undated) DEVICE — TR BAND RADIAL ARTERY COMPRESSION DEVICE: Brand: TR BAND

## (undated) DEVICE — SYRINGE ANGIO 10 CC BRL STD PRNT POLYCARB LT BLU MEDALLION

## (undated) DEVICE — CATH GUID COR EB30 6FR 100CM -- LAUNCHER

## (undated) DEVICE — RADIFOCUS OPTITORQUE ANGIOGRAPHIC CATHETER: Brand: OPTITORQUE

## (undated) DEVICE — SPLINT WR POS F/ARTERIAL ACC -- BX/10

## (undated) DEVICE — SYR POWER 150ML 8IN FILL TUBE --

## (undated) DEVICE — ANGIOGRAPHY KIT CUST [K0910930B] [MERIT MEDICAL SYSTEMS INC]

## (undated) DEVICE — GLIDESHEATH SLENDER ACCESS KIT: Brand: GLIDESHEATH SLENDER

## (undated) DEVICE — KIT ACCS INTRO 4FR L10CM NDL 21GA L7CM GWIRE L40CM

## (undated) DEVICE — PACK PROCEDURE SURG HRT CATH

## (undated) DEVICE — CUSTOM KT PTCA INFL DEV K05 00053H

## (undated) DEVICE — TUBING PRSS MON L6IN PVC M FEM CONN